# Patient Record
Sex: FEMALE | Race: WHITE | Employment: FULL TIME | URBAN - METROPOLITAN AREA
[De-identification: names, ages, dates, MRNs, and addresses within clinical notes are randomized per-mention and may not be internally consistent; named-entity substitution may affect disease eponyms.]

---

## 2018-01-05 ENCOUNTER — APPOINTMENT (OUTPATIENT)
Dept: PHYSICAL THERAPY | Age: 53
End: 2018-01-05

## 2018-01-19 ENCOUNTER — HOSPITAL ENCOUNTER (OUTPATIENT)
Dept: PHYSICAL THERAPY | Age: 53
Discharge: HOME | End: 2018-01-19
Payer: COMMERCIAL

## 2018-01-19 PROCEDURE — 97140 MANUAL THERAPY 1/> REGIONS: CPT

## 2018-01-19 PROCEDURE — 97535 SELF CARE MNGMENT TRAINING: CPT

## 2018-01-19 PROCEDURE — 97161 PT EVAL LOW COMPLEX 20 MIN: CPT

## 2018-01-19 NOTE — PROGRESS NOTES
July De La [address]za 32  [facility] [place] [facility] AT [facility] [address] [facility], [address] ,[address], [address]  Phone: [phone]  Fax: [phone] / [address] [address] PHYSICAL THERAPY SERVICES  Patient Name: Holly Bermudez : 1965   Medical   Diagnosis: Cervicalgia [M54.2]  Left shoulder pain [M25.512] Treatment Diagnosis: Cervical pain2   Onset Date: chronic     Referral Source: Jennifer Rodriguez MD Start of Care [facility]): 2018   Prior Hospitalization: See medical history Provider #: [id number]   Prior Level of Function: Manageable sx with ADLs   Comorbidities: None   Medications: Verified on Patient Summary List   The Plan of Care and following information is based on the information from the initial evaluation.   ==================================================================================  Assessment / key information:  Patient is a 47 y.o. female who presents to [facility] with Dx of c/s pain. 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 Patient reports sx are constant in nature with worsening of sx with prolonged sitting & at night with additional c/o intermittent paraesthesias & dysthesias in B hands with c/o functional weakness (she is RHD), with gripping, lifting & generalized use of B UE. Sx improve with rest & c/s stretching, heat or ice. Average reported pain level at 5/10, 7/10 at worst & 5/10 at best. She previous h/o epidural injection with some relief although temporary in nature. She reports having regular massages with CMT on a monthly basis. Upon objective evaluation patient demonstrates grossly 75% c/s AROM in all planes, limited by generalized ERP throughout all planes. No change in sx with repeated movements in both flex/ext in loaded postures today. Slight reduction of sx, NBAR with manual c/s txn, no change with RRIL.   Spurling test was (-) bilaterally. Patient can benefit from PT interventions to improve posture, decrease pain & improve strength, to facilitate ADLs & overall functional status.   ==================================================================================  Eval Complexity: History LOW Complexity : Zero comorbidities / personal factors that will impact the outcome / POC;  Examination  MEDIUM Complexity : 3 Standardized tests and measures addressing body structure, function, activity limitation and / or participation in recreation ; Presentation LOW Complexity : Stable, uncomplicated ;  Decision Making MEDIUM Complexity : FOTO score of 26-74; Overall Complexity LOW   Problem List: pain affecting function, decrease ROM, decrease strength, decrease ADL/ functional abilitiies, decrease activity tolerance, decrease flexibility/ joint mobility and other FOTO 54   Treatment Plan may include any combination of the following: Therapeutic exercise, Therapeutic activities, Neuromuscular re-education, Physical agent/modality, Manual therapy, Patient education, Self Care training and Other: DN prn  Patient / Family readiness to learn indicated by: asking questions, trying to perform skills and interest  Persons(s) to be included in education: patient (P)  Barriers to Learning/Limitations: None  Measures taken:    Patient Goal (s): \"reduce pain, strengthen muscles in neck, shoulders, perfect posture\"   Patient self reported health status: good  Rehabilitation Potential: good   Short Term Goals: To be accomplished in  2  weeks:  1) Establish HEP to prevent further disability. 2) Patient will report decreased c/o pain to < or = 3-4/10 to facilitate prolonged sitting with manageable sx in c/s.  3) Patient will be able to demonstrate the appropriate sitting posture with or without use of l/s roll to facilitate sitting activities at home/work.  Long Term Goals:  To be accomplished in  4  weeks:  1) Patient will report decreased c/o pain to < or = 2-3/10 to facilitate prolonged sitting with manageable sx in c/s.  2) Improve c/s AROM to 75% in all planes so ROM is available for driving activities without exacerbation of c/s sx. 3) Improve FOTO score from 54 points to > or = 61 points indicating improved tolerance with ADLs in regards to c/s.  4) Patient to be independent & compliant with HEP in preparation for D/C. Frequency / Duration:   Patient to be seen  2-3  times per week for 4  weeks:  Patient / Caregiver education and instruction: self care, activity modification and brace/ splint application  G-Codes (GP): NA  Therapist Signature: SABRINA Cabello cert MDT Date: 1/72/1058   Certification Period: NA Time: 11:19 AM   ===========================================================================================  I certify that the above Physical Therapy Services are being furnished while the patient is under my care. I agree with the treatment plan and certify that this therapy is necessary. Physician Signature:        Date:       Time:     Please sign and return to [facility] [place] or you may fax the signed copy to [phone]. Thank you.

## 2018-01-19 NOTE — PROGRESS NOTES
PHYSICAL THERAPY - DAILY TREATMENT NOTE    Patient Name: Susi Garcia        Date: 2018  : 1965   YES Patient  Verified  Visit #:     Insurance: Payor: Elaine Baumgarten / Plan: Marielena Jerry PPO / Product Type: PPO /      In time: 11:15 A Out time: 12:05 P   Total Treatment Time: 50     Medicare Time Tracking (below)   Total Timed Codes (min):  NA 1:1 Treatment Time:  NA     TREATMENT AREA =  Cervicalgia [M54.2]  Left shoulder pain [M25.512]    SUBJECTIVE  Pain Level (on 0 to 10 scale):  5-6  / 10   Medication Changes/New allergies or changes in medical history, any new surgeries or procedures?     NO    If yes, update Summary List   Subjective Functional Status/Changes:  []  No changes reported     See POC          OBJECTIVE  Modalities Rationale:     decrease pain to improve patient's ability to return to pain-free ADLs   min [] Estim, type/location:                                      []  att     []  unatt     []  w/US     []  w/ice    []  w/heat    min []  Mechanical Traction: type/lbs                   []  pro   []  sup   []  int   []  cont    []  before manual    []  after manual    min []  Ultrasound, settings/location:      min []  Iontophoresis w/ dexamethasone, location:                                               []  take home patch       []  in clinic   10 min []  Ice     [x]  Heat    location/position: Supine to c/s    min []  Vasopneumatic Device, press/temp:    10 min [x]  Other: Pt ed on posture, use of sleeping roll & demonstrated in both supine & S/L positions with 1 pillow, sitting posture   [] Skin assessment post-treatment (if applicable):    []  intact    []  redness- no adverse reaction     []redness  adverse reaction:         min Therapeutic Exercise:  [x]  See flow sheet   Rationale:          10 min Manual Therapy: STM/MFR to R>L upper/lower c/s paraspinals in supine, manual c/s txn in supine   Rationale:      decrease pain, increase ROM, increase tissue extensibility, decrease trigger points and increase postural awareness to improve patient's ability to return to pain-free c/s AROM with ADLs     min Therapeutic Activity:    Rationale:      min Neuromuscular Re-ed:    Rationale:        min Gait Training:    Rationale:       min Patient Education:  YES  Reviewed HEP   []  Progressed/Changed HEP based on: Other Objective/Functional Measures:    See POC     Post Treatment Pain Level (on 0 to 10) scale:   5-6  / 10     ASSESSMENT  Assessment/Changes in Function:   See POC     []  See Progress Note/Recertification   Patient will continue to benefit from skilled PT services to analyze and cue movement patterns, analyze and modify body mechanics/ergonomics, assess and modify postural abnormalities and instruct in home and community integration to attain remaining goals.    Progress toward goals / Updated goals:    Progressing towards goals established at [address] [facility] [address]  [x]  Upgrade activities as tolerated YES Continue plan of care   []  Discharge due to :    []  Other:      Therapist: Jeronimo Aden PT    Date: 1/19/2018 Time: 4:38 PM     Future Appointments  Date Time Provider China Charles   1/23/2018 12:00 PM Uzma Romero PT [facility]   1/26/2018 11:00 AM Jeronimo dAen PT [facility] [place]   1/30/2018 9:30 AM Uzma Romero PT [facility] [place]   2/1/2018 4:00 PM Jeronimo Aden PT [facility] [place]   2/6/2018 12:00 PM Jeronimo Aden PT [facility] [place]   2/8/2018 10:30 AM Uzma Romero PT [facility] [place]   2/13/2018 11:00 AM Uzma Romero PT [facility] [place]   2/15/2018 10:30 AM Shaniqua Patel, PT [facility]

## 2018-01-23 ENCOUNTER — APPOINTMENT (OUTPATIENT)
Dept: PHYSICAL THERAPY | Age: 53
End: 2018-01-23
Payer: COMMERCIAL

## 2018-01-26 ENCOUNTER — HOSPITAL ENCOUNTER (OUTPATIENT)
Dept: PHYSICAL THERAPY | Age: 53
Discharge: HOME | End: 2018-01-26
Payer: COMMERCIAL

## 2018-01-26 PROCEDURE — 97110 THERAPEUTIC EXERCISES: CPT

## 2018-01-26 PROCEDURE — 97140 MANUAL THERAPY 1/> REGIONS: CPT

## 2018-01-26 NOTE — PROGRESS NOTES
PHYSICAL THERAPY - DAILY TREATMENT NOTE    Patient Name: Radu Ao        Date: 2018  : 1965   YES Patient  Verified  Visit #:      12  Insurance: Payor: Ashley Myers / Plan: Luz Marina Begun PPO / Product Type: PPO /      In time: 11 A Out time: 12:03 P   Total Treatment Time: 60     Medicare Time Tracking (below)   Total Timed Codes (min):  NA 1:1 Treatment Time:  NA     TREATMENT AREA =  Cervical spine pain [M54.2]    SUBJECTIVE  Pain Level (on 0 to 10 scale):  5  / 10   Medication Changes/New allergies or changes in medical history, any new surgeries or procedures? NO    If yes, update Summary List   Subjective Functional Status/Changes:  []  No changes reported     Patient reports she just came from her chiropractor adjustment this am & will see her CMT today, she tries to see them at least once/month.            OBJECTIVE  Modalities Rationale:     decrease pain to improve patient's ability to return to pain-free sitting    min [] Estim, type/location:                                      []  att     []  unatt     []  w/US     []  w/ice    []  w/heat    min []  Mechanical Traction: type/lbs                   []  pro   []  sup   []  int   []  cont    []  before manual    []  after manual    min []  Ultrasound, settings/location:      min []  Iontophoresis w/ dexamethasone, location:                                               []  take home patch       []  in clinic   10 min []  Ice     [x]  Heat    location/position: Supine to c/s    min []  Vasopneumatic Device, press/temp:     min []  Other:    [] Skin assessment post-treatment (if applicable):    []  intact    []  redness- no adverse reaction     []redness  adverse reaction:        35 min Therapeutic Exercise:  [x]  See flow sheet   Rationale:      increase ROM and increase strength to improve the patients ability to return to light lifting at home      15 min Manual Therapy: Manual c/s txn, RRIL with OP, STM/DTM to B upper/lower c/s paraspinals in supine   Rationale:     Decrease pain with c/s AROM with driving     min Therapeutic Activity:    Rationale:         min Neuromuscular Re-ed:    Rationale:       min Gait Training:    Rationale:       min Patient Education:  YES  Reviewed HEP   []  Progressed/Changed HEP based on: Other Objective/Functional Measures:    Initiated TE per flow sheet     Post Treatment Pain Level (on 0 to 10) scale:   3-4  / 10     ASSESSMENT  Assessment/Changes in Function:     Decreased c/o pain with gentle c/s stretches today & 1/2 FR stretch      []  See Progress Note/Recertification   Patient will continue to benefit from skilled PT services to modify and progress therapeutic interventions, address functional mobility deficits, address ROM deficits, analyze and address soft tissue restrictions, assess and modify postural abnormalities and instruct in home and community integration to attain remaining goals.    Progress toward goals / Updated goals:    Progressing towards goals established at [address]  [x]  Upgrade activities as tolerated YES Continue plan of care   []  Discharge due to :    []  Other:      Therapist: Maria C Gomez, PT    Date: 1/26/2018 Time: 12:59 PM     Future Appointments  Date Time Provider China Charles   1/30/2018 9:30 AM Kelby Tineo, PT [facility] [place]   2/1/2018 4:00 PM Maria C Gomez, PT [facility] [place]   2/6/2018 12:00 PM Maria C Gomez PT [facility] [place]   2/8/2018 10:30 AM Kelby Tineo PT [facility] [place]   2/13/2018 11:00 AM Kelby Tineo PT [facility] [place]   2/15/2018 10:30 AM Glendia Charm Orest Mortimer, PT [facility] [place]

## 2018-01-30 ENCOUNTER — HOSPITAL ENCOUNTER (OUTPATIENT)
Dept: PHYSICAL THERAPY | Age: 53
Discharge: HOME | End: 2018-01-30
Payer: COMMERCIAL

## 2018-01-30 PROCEDURE — 97140 MANUAL THERAPY 1/> REGIONS: CPT

## 2018-01-30 PROCEDURE — 97110 THERAPEUTIC EXERCISES: CPT

## 2018-01-30 NOTE — PROGRESS NOTES
PHYSICAL THERAPY - DAILY TREATMENT NOTE    Patient Name: Stephen Milian        Date: 2018  : 1965   YES Patient  Verified  Visit #:   3   of   12  Insurance: Payor: Sanaz Greenberg / Plan: Angela Burleson PPO / Product Type: PPO /      In time: 940 Out time: 1025   Total Treatment Time: 40     Medicare Time Tracking (below)   Total Timed Codes (min):   1:1 Treatment Time:       TREATMENT AREA =  Cervical spine pain [M54.2]  SUBJECTIVE  Pain Level (on 0 to 10 scale):  3  / 10   Medication Changes/New allergies or changes in medical history, any new surgeries or procedures?     NO    If yes, update Summary List   Subjective Functional Status/Changes:  []  No changes reported     The pain is worse when Im having to study/read for school and look down          OBJECTIVE  Modalities Rationale:     decrease inflammation, decrease pain and increase tissue extensibility to improve patient's ability to perform functional ADLs   min [] Estim, type/location:                                      []  att     []  unatt     []  w/US     []  w/ice    []  w/heat    min []  Mechanical Traction: type/lbs                   []  pro   []  sup   []  int   []  cont    []  before manual    []  after manual    min []  Ultrasound, settings/location:      min []  Iontophoresis w/ dexamethasone, location:                                               []  take home patch       []  in clinic   @ home min []  Ice     []  Heat    location/position:     min []  Vasopneumatic Device, press/temp:     min []  Other:    [] Skin assessment post-treatment (if applicable):    []  intact    []  redness- no adverse reaction     []redness  adverse reaction:        30 min Therapeutic Exercise:  [x]  See flow sheet   Rationale:      increase ROM and increase strength to improve the patients ability to regain functional mobility of C/S for improved positional tolerance    10 min Manual Therapy: Technique:      [] S/DTM []IASTM []PROM [] Passive Stretching [x]manual TPR  [x] TDN (see objective; actual needle insertion time not billed)  []Jt manipulation:Gr I [] II []  III [] IV[] V[]  Treatment/Area:  c/s   Rationale:      decrease pain, increase ROM, increase tissue extensibility and decrease trigger points to improve patient's ability to tolerate reading    Dry Needling Procedure Note    Dry Needle Session Number:  1    Procedure: An intramuscular manual therapy (dry needling) and a neuro-muscular re-education treatment was done to deactivate myofascial trigger points, with a 15/30 gauge solid filament needle, under aseptic technique. Indication(s): [] Muscle spasms [x] Myalgia/Myositis  [] Muscle cramps      [x] Muscle imbalances [] TMD (TMJ) [x] Myofascial pain & dysfunction     [] Other: __    Chart reviewed for the following:  IMeghana, PT, have reviewed the medical history, summary list and precautions/contraindications for [facility]. TIME OUT performed immediately prior to start of procedure:  100 (enter time the timeout was completed)  IJeffrey, PT, have performed the following reviews on [facility] prior to the start of the session:      [x] Patient was identified by name and date of birth    [x] Agreement on all muscles being treated was verified   [x] Purpose of dry needling, side effects, possible complications, risks and benefits were explained to the patient   [x] Procedure site(s) verified  [x] Patient was positioned for comfort and draped for privacy  [x] Informed Consent was signed (initial visit) and verified verbally (subsequent visits)  [x] Patient was instructed on the need to report the use of blood thinners and/or immunosuppressant medications  [x] How to respond to possible adverse effects of treatment  [x] Self treatment of post needling soreness: ice, heat (moist heat, heat wraps) and stretching  [x] Opportunity was given to ask any questions, all questions were answered            Treatment:   The following muscles were treated today:    Right: UT, mid c/s paraspinals   Left: UT and mid c/s paraspinals     Patients response to todays treatment:   [x]  LTRs  []  Muscle Relaxation  []  Pain Relief  []  Decreased Tinnitus  []  Decreased HAs [x]  Post needling soreness  [x]  Increased ROM   []  Other:         min Therapeutic Activity:    Rationale:    increase strength, improve coordination and increase proprioception to improve the patients ability to      min Neuromuscular Re-ed:    Rationale:    improve coordination, improve balance and increase proprioception to improve the patients ability to      min Gait Training:    Rationale:       min Patient Education:  YES  Reviewed HEP   []  Progressed/Changed HEP based on: Other Objective/Functional Measures:    + LTR elicited to muscles to treated muscles with dry needling technique. No adverse reactions from [address]  Postural review including elevating papers/books on desk to dec need for c/s flexion     Post Treatment Pain Level (on 0 to 10) scale:   3  / 10     ASSESSMENT  Assessment/Changes in Function:     Progressed treatment as appropriate with good patient tolerance     []  See Progress Note/Recertification   Patient will continue to benefit from skilled PT services to modify and progress therapeutic interventions, address functional mobility deficits, address ROM deficits, address strength deficits, analyze and address soft tissue restrictions, analyze and cue movement patterns, analyze and modify body mechanics/ergonomics and assess and modify postural abnormalities to attain remaining goals.    Progress toward goals / Updated goals:    Progressing towards STG2     PLAN  [x]  Upgrade activities as tolerated YES Continue plan of care   []  Discharge due to :    []  Other:      Therapist: Samreen Calvo, PT, DPT, MTC, CMTPT    Date: 1/30/2018 Time: 12:56 PM     Future Appointments  Date Time Provider China Charles   2/1/2018 4:00 PM Haresh Bowman Adela Banks [facility]   2/6/2018 12:00 PM Michelle Saucedo [facility]   2/8/2018 10:30 AM Marino London, PT [facility]   2/13/2018 11:00 AM Marino London, PT [facility]   2/15/2018 10:30 AM Shaniqua Mar, PT [facility]

## 2018-02-01 ENCOUNTER — HOSPITAL ENCOUNTER (OUTPATIENT)
Dept: PHYSICAL THERAPY | Age: 53
Discharge: HOME | End: 2018-02-01
Payer: COMMERCIAL

## 2018-02-01 PROCEDURE — 97140 MANUAL THERAPY 1/> REGIONS: CPT

## 2018-02-01 PROCEDURE — 97110 THERAPEUTIC EXERCISES: CPT

## 2018-02-01 NOTE — PROGRESS NOTES
PHYSICAL THERAPY - DAILY TREATMENT NOTE    Patient Name: Bryan Aguilar        Date: 2018  : 1965   YES Patient  Verified  Visit #:     Insurance: Payor: Ronen Massey / Plan: Cruz Hoffman PPO / Product Type: PPO /      In time: 4:10 P Out time: 5:10 P   Total Treatment Time: 54     Medicare Time Tracking (below)   Total Timed Codes (min):  NA 1:1 Treatment Time:  NA     TREATMENT AREA =  Cervical spine pain [M54.2]    SUBJECTIVE  Pain Level (on 0 to 10 scale):  6  / 10   Medication Changes/New allergies or changes in medical history, any new surgeries or procedures? NO    If yes, update Summary List   Subjective Functional Status/Changes:  []  No changes reported     Patient reports she did well after DN last visit. She was scrubbing her carpet earlier & could feel pain in her neck.   She will see her CMT tonight at 7:00 pm.        OBJECTIVE  Modalities Rationale:     decrease pain to improve patient's ability to return to pain-free c/s AROM   min [] Estim, type/location:                                      []  att     []  unatt     []  w/US     []  w/ice    []  w/heat    min []  Mechanical Traction: type/lbs                   []  pro   []  sup   []  int   []  cont    []  before manual    []  after manual    min []  Ultrasound, settings/location:      min []  Iontophoresis w/ dexamethasone, location:                                               []  take home patch       []  in clinic   10 min []  Ice     [x]  Heat    location/position: Supine to c/s     min []  Vasopneumatic Device, press/temp:     min []  Other:    [] Skin assessment post-treatment (if applicable):    []  intact    []  redness- no adverse reaction     []redness  adverse reaction:        30 min Therapeutic Exercise:  [x]  See flow sheet   Rationale:      increase ROM and increase strength to improve the patients ability to return to pain-free ADLs     15 min Manual Therapy: DTM/TPR to B UT & lower c/s paraspinals, manual c/s txn in supine    Rationale:     Improve ROM      min Therapeutic Activity:    Rationale:         min Neuromuscular Re-ed:    Rationale:       min Gait Training:    Rationale:       min Patient Education:  YES  Reviewed HEP   []  Progressed/Changed HEP based on: Other Objective/Functional Measures:    Progressed to full FR  RRIS, RREIS today     Post Treatment Pain Level (on 0 to 10) scale:   4  / 10     ASSESSMENT  Assessment/Changes in Function:     Improved tolerance to RREIS today with no increase in c/s pain     []  See Progress Note/Recertification   Patient will continue to benefit from skilled PT services to modify and progress therapeutic interventions, address functional mobility deficits, address ROM deficits, address strength deficits and instruct in home and community integration to attain remaining goals.    Progress toward goals / Updated goals:    Progressing towards LTG 2     PLAN  [x]  Upgrade activities as tolerated YES Continue plan of care   []  Discharge due to :    []  Other:      Therapist: Javier Maloney, PT    Date: 2/1/2018 Time: 6:34 PM     Future Appointments  Date Time Provider China Charles   2/6/2018 12:00 PM Fransisco Baum [facility]   2/8/2018 10:30 AM Sergio Champagne, PT [facility]   2/13/2018 11:00 AM Sergio Champagne PT [facility]   2/15/2018 10:30 AM Javier Maloney PT [facility]

## 2018-02-06 ENCOUNTER — HOSPITAL ENCOUNTER (OUTPATIENT)
Dept: PHYSICAL THERAPY | Age: 53
Discharge: HOME | End: 2018-02-06
Payer: COMMERCIAL

## 2018-02-06 PROCEDURE — 97012 MECHANICAL TRACTION THERAPY: CPT

## 2018-02-06 PROCEDURE — 97140 MANUAL THERAPY 1/> REGIONS: CPT

## 2018-02-06 PROCEDURE — 97110 THERAPEUTIC EXERCISES: CPT

## 2018-02-06 NOTE — PROGRESS NOTES
PHYSICAL THERAPY - DAILY TREATMENT NOTE    Patient Name: Danny Mata        Date: 2018  : 1965   YES Patient  Verified  Visit #:     Insurance: Payor: Sweetie Grade / Plan: Julio Cesar Swartz PPO / Product Type: PPO /      In time: 12:15 P Out time: 1:15 P   Total Treatment Time: 54     Medicare Time Tracking (below)   Total Timed Codes (min):  NA 1:1 Treatment Time:  NA     TREATMENT AREA =  Cervical spine pain [M54.2]    SUBJECTIVE  Pain Level (on 0 to 10 scale):  5  / 10   Medication Changes/New allergies or changes in medical history, any new surgeries or procedures? NO    If yes, update Summary List   Subjective Functional Status/Changes:  []  No changes reported     Patient reports no new complaints since last treatment, she has been feeling better since starting PT. She is trying to be aware of her posture & is doing her chin tucks throughout her day.           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    min [] Estim, type/location:                                      []  att     []  unatt     []  w/US     []  w/ice    []  w/heat   10 min [x]  Mechanical Traction: type/lbs 19#                  []  pro   [x]  sup   []  int   [x]  cont    []  before manual    [x]  after manual    min []  Ultrasound, settings/location:      min []  Iontophoresis w/ dexamethasone, location:                                               []  take home patch       []  in clinic   10  NB min []  Ice     [x]  Heat    location/position: With c/s txn in supine    min []  Vasopneumatic Device, press/temp:     min []  Other:    [] Skin assessment post-treatment (if applicable):    []  intact    []  redness- no adverse reaction     []redness  adverse reaction:        25 min Therapeutic Exercise:  [x]  See flow sheet   Rationale:      increase ROM and increase strength to improve the patients ability to return to light lifting     20 min Manual Therapy: Manual c/s txn, RRIL with PT OP, DTM/TPR to B UT, lower c/s in supine   Rationale:      decrease pain, increase ROM, increase tissue extensibility, decrease trigger points and increase postural awareness to improve patient's ability to return to pain-free c/s AROM with driving     min Therapeutic Activity:    Rationale:      min Neuromuscular Re-ed:    Rationale:        min Gait Training:    Rationale:       min Patient Education:  YES  Reviewed HEP   []  Progressed/Changed HEP based on: Other Objective/Functional Measures:    TE per flow sheet      Post Treatment Pain Level (on 0 to 10) scale:   3  / 10     ASSESSMENT  Assessment/Changes in Function:   Good reduction in L sided c/s pain after trial of c/s txn today, mechanical      []  See Progress Note/Recertification   Patient will continue to benefit from skilled PT services to modify and progress therapeutic interventions, address functional mobility deficits, address ROM deficits, analyze and address soft tissue restrictions, analyze and cue movement patterns, analyze and modify body mechanics/ergonomics and assess and modify postural abnormalities to attain remaining goals.    Progress toward goals / Updated goals:    Progressing towards STG 2, 3     PLAN  [x]  Upgrade activities as tolerated YES Continue plan of care   []  Discharge due to :    []  Other:      Therapist: Soco Espinal PT    Date: 2/6/2018 Time: 1:46 PM     Future Appointments  Date Time Provider [place] Charles   2/8/2018 10:30 AM Sherita Asif PT [facility] [place]   2/13/2018 11:00 AM Sherita Asif PT [facility] [place]   2/15/2018 10:30 AM Soco Espinal PT [facility] [place]

## 2018-02-08 ENCOUNTER — APPOINTMENT (OUTPATIENT)
Dept: PHYSICAL THERAPY | Age: 53
End: 2018-02-08
Payer: COMMERCIAL

## 2018-02-09 ENCOUNTER — HOSPITAL ENCOUNTER (OUTPATIENT)
Dept: PHYSICAL THERAPY | Age: 53
Discharge: HOME | End: 2018-02-09
Payer: COMMERCIAL

## 2018-02-09 PROCEDURE — 97140 MANUAL THERAPY 1/> REGIONS: CPT

## 2018-02-09 PROCEDURE — 97110 THERAPEUTIC EXERCISES: CPT

## 2018-02-09 NOTE — PROGRESS NOTES
PHYSICAL THERAPY - DAILY TREATMENT NOTE    Patient Name: Garrison Saint        Date: 2018  : 1965   YES Patient  Verified  Visit #:     Insurance: Payor: Savanah Ego / Plan: Mat Daily PPO / Product Type: PPO /      In time: 1200 Out time: 110   Total Treatment Time: 55     Medicare Time Tracking (below)   Total Timed Codes (min):   1:1 Treatment Time:       TREATMENT AREA =  Cervical spine pain [M54.2]  SUBJECTIVE  Pain Level (on 0 to 10 scale):  5  / 10   Medication Changes/New allergies or changes in medical history, any new surgeries or procedures?     NO    If yes, update Summary List   Subjective Functional Status/Changes:  []  No changes reported     I felt a lot of relief the day after DN last time         OBJECTIVE  Modalities Rationale:     decrease inflammation, decrease pain and increase tissue extensibility to improve patient's ability to perform functional ADLs   min [] Estim, type/location:                                      []  att     []  unatt     []  w/US     []  w/ice    []  w/heat    min []  Mechanical Traction: type/lbs                   []  pro   []  sup   []  int   []  cont    []  before manual    []  after manual    min []  Ultrasound, settings/location:      min []  Iontophoresis w/ dexamethasone, location:                                               []  take home patch       []  in clinic   10 min [x]  Ice     []  Heat    location/position: C/s in supine    min []  Vasopneumatic Device, press/temp:     min []  Other:    [] Skin assessment post-treatment (if applicable):    []  intact    []  redness- no adverse reaction     []redness  adverse reaction:        30 min Therapeutic Exercise:  [x]  See flow sheet   Rationale:      increase ROM and increase strength to improve the patients ability to regain functional mobility of C/S for improved positional tolerance    15 min Manual Therapy: Technique:      [x] S/DTM []IASTM []PROM [] Passive Stretching   [x]manual TPR [x] TDN (see objective; actual needle insertion time not billed)  []Jt manipulation:Gr I [] II []  III [] IV[] V[]  Treatment/Area:  c/s   Rationale:      decrease pain, increase ROM, increase tissue extensibility and decrease trigger points to improve patient's ability to tolerate reading    Dry Needling Procedure Note    Dry Needle Session Number:  2  Procedure: An intramuscular manual therapy (dry needling) and a neuro-muscular re-education treatment was done to deactivate myofascial trigger points, with a 15/30 gauge solid filament needle, under aseptic technique. Indication(s): [] Muscle spasms [x] Myalgia/Myositis  [] Muscle cramps      [x] Muscle imbalances [] TMD (TMJ) [x] Myofascial pain & dysfunction     [] Other: __    Chart reviewed for the following:  IMeghana, PT, have reviewed the medical history, summary list and precautions/contraindications for [facility].      TIME OUT performed immediately prior to start of procedure:  1220pm (enter time the timeout was completed)  ITerrie, PT, have performed the following reviews on [facility] prior to the start of the session:      [x] Patient was identified by name and date of birth    [x] Agreement on all muscles being treated was verified   [x] Purpose of dry needling, side effects, possible complications, risks and benefits were explained to the patient   [x] Procedure site(s) verified  [x] Patient was positioned for comfort and draped for privacy  [x] Informed Consent was signed (initial visit) and verified verbally (subsequent visits)  [x] Patient was instructed on the need to report the use of blood thinners and/or immunosuppressant medications  [x] How to respond to possible adverse effects of treatment  [x] Self treatment of post needling soreness: ice, heat (moist heat, heat wraps) and stretching  [x] Opportunity was given to ask any questions, all questions were answered            Treatment:  The following muscles were treated today:    Right: UT, mid c/s paraspinals   Left: UT, mid c/s paraspinals     Patients response to todays treatment:   [x]  LTRs  []  Muscle Relaxation  []  Pain Relief  []  Decreased Tinnitus  []  Decreased HAs [x]  Post needling soreness  [x]  Increased ROM   []  Other:         min Therapeutic Activity:    Rationale:    increase strength, improve coordination and increase proprioception to improve the patients ability to      min Neuromuscular Re-ed:    Rationale:    improve coordination, improve balance and increase proprioception to improve the patients ability to      min Gait Training:    Rationale:       min Patient Education:  YES  Reviewed HEP   []  Progressed/Changed HEP based on: Other Objective/Functional Measures:    + LTR elicited to muscles to treated muscles with dry needling technique. No adverse reactions from [place]  TE per FS     Post Treatment Pain Level (on 0 to 10) scale:   4 / 10     ASSESSMENT  Assessment/Changes in Function:     Progressed treatment as appropriate with good patient tolerance     []  See Progress Note/Recertification   Patient will continue to benefit from skilled PT services to modify and progress therapeutic interventions, address functional mobility deficits, address ROM deficits, address strength deficits, analyze and address soft tissue restrictions, analyze and cue movement patterns, analyze and modify body mechanics/ergonomics and assess and modify postural abnormalities to attain remaining goals.    Progress toward goals / Updated goals:    Progressing towards STG2     PLAN  [x]  Upgrade activities as tolerated YES Continue plan of care   []  Discharge due to :    []  Other:      Therapist: Leanna English, PT, DPT, MTC, CMTPT    Date: 2/9/2018 Time: 12:56 PM     Future Appointments  Date Time Provider [place] Charles   2/13/2018 11:00 AM Manoj Batista, PT [facility] [place]   2/15/2018 10:30 AM Katheryn Heath, PT [facility] [place]

## 2018-02-13 ENCOUNTER — HOSPITAL ENCOUNTER (OUTPATIENT)
Dept: PHYSICAL THERAPY | Age: 53
Discharge: HOME | End: 2018-02-13
Payer: COMMERCIAL

## 2018-02-13 PROCEDURE — 97140 MANUAL THERAPY 1/> REGIONS: CPT

## 2018-02-13 PROCEDURE — 97012 MECHANICAL TRACTION THERAPY: CPT

## 2018-02-13 PROCEDURE — 97110 THERAPEUTIC EXERCISES: CPT

## 2018-02-15 ENCOUNTER — APPOINTMENT (OUTPATIENT)
Dept: PHYSICAL THERAPY | Age: 53
End: 2018-02-15
Payer: COMMERCIAL

## 2018-02-20 ENCOUNTER — APPOINTMENT (OUTPATIENT)
Dept: PHYSICAL THERAPY | Age: 53
End: 2018-02-20
Payer: COMMERCIAL

## 2018-02-23 ENCOUNTER — HOSPITAL ENCOUNTER (OUTPATIENT)
Dept: PHYSICAL THERAPY | Age: 53
Discharge: HOME | End: 2018-02-23
Payer: COMMERCIAL

## 2018-02-23 PROCEDURE — 97012 MECHANICAL TRACTION THERAPY: CPT

## 2018-02-23 PROCEDURE — 97140 MANUAL THERAPY 1/> REGIONS: CPT

## 2018-02-23 PROCEDURE — 97110 THERAPEUTIC EXERCISES: CPT

## 2018-02-23 NOTE — PROGRESS NOTES
PHYSICAL THERAPY - DAILY TREATMENT NOTE    Patient Name: Danny Mata        Date: 2018  : 1965   YES Patient  Verified  Visit #:     Insurance: Payor: Sweetie Grade / Plan: Julio Cesar Swartz PPO / Product Type: PPO /      In time: 1230 Out time: 135   Total Treatment Time: 40     Medicare Time Tracking (below)   Total Timed Codes (min):   1:1 Treatment Time:       TREATMENT AREA =  Cervical spine pain [M54.2]  SUBJECTIVE  Pain Level (on 0 to 10 scale):  3-4  / 10   Medication Changes/New allergies or changes in medical history, any new surgeries or procedures?     NO    If yes, update Summary List   Subjective Functional Status/Changes:  []  No changes reported     Elevating my books so im looking at them more forward and not down has helped a lot       OBJECTIVE  Modalities Rationale:     decrease inflammation, decrease pain and increase tissue extensibility to improve patient's ability to perform functional ADLs   min [] Estim, type/location:                                      []  att     []  unatt     []  w/US     []  w/ice    []  w/heat   10 min [x]  Mechanical Traction: type/lbs 18# with CP                  []  pro   [x]  sup   []  int   [x]  cont    []  before manual    [x]  after manual    min []  Ultrasound, settings/location:      min []  Iontophoresis w/ dexamethasone, location:                                               []  take home patch       []  in clinic    min []  Ice     []  Heat    location/position:     min []  Vasopneumatic Device, press/temp:     min []  Other:    [] Skin assessment post-treatment (if applicable):    []  intact    []  redness- no adverse reaction     []redness  adverse reaction:        20 min Therapeutic Exercise:  [x]  See flow sheet   Rationale:      increase ROM and increase strength to improve the patients ability to regain functional mobility of C/S for improved positional tolerance    10 min Manual Therapy: Technique:      [x] S/DTM []IASTM []PROM [] Passive Stretching   [x]manual TPR  [x] TDN (see objective; actual needle insertion time not billed)  []Jt manipulation:Gr I [] II []  III [] IV[] V[]  Treatment/Area:  c/s   Rationale:      decrease pain, increase ROM, increase tissue extensibility and decrease trigger points to improve patient's ability to tolerate reading    Dry Needling Procedure Note    Dry Needle Session Number:  4  Procedure: An intramuscular manual therapy (dry needling) and a neuro-muscular re-education treatment was done to deactivate myofascial trigger points, with a 15/30 gauge solid filament needle, under aseptic technique. Indication(s): [] Muscle spasms [x] Myalgia/Myositis  [] Muscle cramps      [x] Muscle imbalances [] TMD (TMJ) [x] Myofascial pain & dysfunction     [] Other: __    Chart reviewed for the following:  IMeghana, PT, have reviewed the medical history, summary list and precautions/contraindications for [facility].      TIME OUT performed immediately prior to start of procedure:  1240pm (enter time the timeout was completed)  ITerrie, PT, have performed the following reviews on [facility] prior to the start of the session:      [x] Patient was identified by name and date of birth    [x] Agreement on all muscles being treated was verified   [x] Purpose of dry needling, side effects, possible complications, risks and benefits were explained to the patient   [x] Procedure site(s) verified  [x] Patient was positioned for comfort and draped for privacy  [x] Informed Consent was signed (initial visit) and verified verbally (subsequent visits)  [x] Patient was instructed on the need to report the use of blood thinners and/or immunosuppressant medications  [x] How to respond to possible adverse effects of treatment  [x] Self treatment of post needling soreness: ice, heat (moist heat, heat wraps) and stretching  [x] Opportunity was given to ask any questions, all questions were answered Treatment:  The following muscles were treated today:    Right: UT   Left: UT     Patients response to todays treatment:   [x]  LTRs  []  Muscle Relaxation  []  Pain Relief  []  Decreased Tinnitus  []  Decreased HAs [x]  Post needling soreness  [x]  Increased ROM   []  Other:         min Therapeutic Activity:    Rationale:    increase strength, improve coordination and increase proprioception to improve the patients ability to      min Neuromuscular Re-ed:    Rationale:    improve coordination, improve balance and increase proprioception to improve the patients ability to      min Gait Training:    Rationale:       min Patient Education:  YES  Reviewed HEP   []  Progressed/Changed HEP based on: Other Objective/Functional Measures:    + LTR elicited to muscles to treated muscles with dry needling technique. No adverse reactions from [address]    TE per FS     Post Treatment Pain Level (on 0 to 10) scale:   3-4  / 10     ASSESSMENT  Assessment/Changes in Function:     Able to perform c/s retract without cueing for proper execution     []  See Progress Note/Recertification   Patient will continue to benefit from skilled PT services to modify and progress therapeutic interventions, address functional mobility deficits, address ROM deficits, address strength deficits, analyze and address soft tissue restrictions, analyze and cue movement patterns, analyze and modify body mechanics/ergonomics and assess and modify postural abnormalities to attain remaining goals.    Progress toward goals / Updated goals:    Progressing towards c/s stabilization strength/endurance     PLAN  [x]  Upgrade activities as tolerated YES Continue plan of care   []  Discharge due to :    []  Other:      Therapist: Josue Colorado PT, DPT, MTC, CMTPT    Date: 2/23/2018 Time: 12:56 PM     Future Appointments  Date Time Provider China Charles   2/26/2018 11:00 AM Marino London PT [facility] [place]   3/2/2018 12:00 PM Triny Agudelo, PT [facility]

## 2018-02-26 ENCOUNTER — APPOINTMENT (OUTPATIENT)
Dept: PHYSICAL THERAPY | Age: 53
End: 2018-02-26
Payer: COMMERCIAL

## 2018-03-02 ENCOUNTER — HOSPITAL ENCOUNTER (OUTPATIENT)
Dept: PHYSICAL THERAPY | Age: 53
Discharge: HOME | End: 2018-03-02
Payer: COMMERCIAL

## 2018-03-02 PROCEDURE — 97140 MANUAL THERAPY 1/> REGIONS: CPT

## 2018-03-02 PROCEDURE — 97110 THERAPEUTIC EXERCISES: CPT

## 2018-03-02 PROCEDURE — 97012 MECHANICAL TRACTION THERAPY: CPT

## 2018-03-02 NOTE — PROGRESS NOTES
[address]  [facility] [place] [facility]  [facility], [address] [place] [address], [address]  Phone: [phone]  Fax: [phone] SUMMARY  Patient Name: Aki Beyer : 1965   Treatment/Medical Diagnosis: Cervical spine pain [M54.2]   Referral Source: Celina Roger MD     Date of Initial Visit: 18 Attended Visits: 9 Missed Visits: 0     SUMMARY OF TREATMENT  Therapeutic exercise including ROM, stretching, gentle strengthening, stabilization training, postural ed, patient education, HEP instruction, MHP, manual therapy, dry needling, manual/mechanical c/s traction. CURRENT STATUS  Mrs. Aguila has made good progress with PT & continues to report fairly constant c/o pain in c/s with average reported pain level at 3-5/10. She reports 50% improvement in overall function & will be placed on hold from PT at this time & was in agreement with managing her sx with her current home program.     Goal/Measure of Progress Goal Met? 1.  Establish HEP to prevent further disability. Status at last Eval: NA Current Status: Good compliance with HEP yes   2. Patient will report decreased c/o pain to < or = 3-4/10 to facilitate prolonged sitting with manageable sx in c/s. Status at last Eval: 7/10 at worst  5/10 average  5/10 at best Current Status: 6-7/10 at worst  3-5/10 average  3/10 at best Partially met   3. Patient will be able to demonstrate the appropriate sitting posture with or without use of l/s roll to facilitate sitting activities at home/work. Status at last Eval: unable Current Status: Good sitting posture in both un/supported sitting posture yes   4. Improve FOTO score from 54 points to > or = 61 points indicating improved tolerance with ADLs in regards to c/s.    Status at last Eval: 54 Current Status: 55 Partially met      RECOMMENDATIONS  Other: Partially met goals, DC to home program    If you have any questions/comments please contact us directly at [phone]. Thank you for allowing us to assist in the care of your patient.     Therapist Signature: SABRINA Talley, cert MDT Date: 4-94-16     Time: 4:02 PM

## 2018-03-02 NOTE — PROGRESS NOTES
PHYSICAL THERAPY - DAILY TREATMENT NOTE    Patient Name: Radha Davidson        Date: 3/2/2018  : 1965   YES Patient  Verified  Visit #:     Insurance: Payor: Tino Fuentes / Plan: Nicho Melendez PPO / Product Type: PPO /      In time: 12:10 P Out time: 1:15 P   Total Treatment Time: 50     Medicare Time Tracking (below)   Total Timed Codes (min):  NA 1:1 Treatment Time:  NA     TREATMENT AREA =  Cervical spine pain [M54.2]    SUBJECTIVE  Pain Level (on 0 to 10 scale):  3  / 10   Medication Changes/New allergies or changes in medical history, any new surgeries or procedures?     NO    If yes, update Summary List   Subjective Functional Status/Changes:  []  No changes reported     See DC summary           OBJECTIVE  Modalities Rationale:     decrease pain to improve patient's ability to return to pain-free ADLs   min [] Estim, type/location:                                      []  att     []  unatt     []  w/US     []  w/ice    []  w/heat   15 min [x]  Mechanical Traction: type/lbs 19#                  []  pro   [x]  sup   []  int   [x]  cont    []  before manual    [x]  after manual    min []  Ultrasound, settings/location:      min []  Iontophoresis w/ dexamethasone, location:                                               []  take home patch       []  in clinic   15  NB min []  Ice     [x]  Heat    location/position: C/s with txn in supine    min []  Vasopneumatic Device, press/temp:     min []  Other:    [] Skin assessment post-treatment (if applicable):    []  intact    []  redness- no adverse reaction     []redness  adverse reaction:        15 min Therapeutic Exercise:  [x]  See flow sheet   Rationale:      increase ROM and increase strength to improve the patients ability to return to light lifting     20 min Manual Therapy: DTM/TPR to B c/s PS, manual c/s txn in supine   Rationale:      decrease pain, increase ROM, increase tissue extensibility, decrease trigger points and increase postural awareness to improve patient's ability to return to pain-free sitting     min Therapeutic Activity:    Rationale:      min Neuromuscular Re-ed:    Rationale:        min Gait Training:    Rationale:       min Patient Education:  YES  Reviewed HEP   []  Progressed/Changed HEP based on: Other Objective/Functional Measures:    FOTO 55     Post Treatment Pain Level (on 0 to 10) scale:   3  / 10     ASSESSMENT  Assessment/Changes in Function:   Pt in agreement with being on hold from PT, possible DC to HEP, updated HEP & issued GTB     []  See Progress Note/Recertification   Patient will continue to benefit from skilled PT services to instruct in home and community integration to attain remaining goals. Progress toward goals / Updated goals:    Progressing towards LTGs, see summary to MD     PLAN  []  Upgrade activities as tolerated  Continue plan of care   [x]  Discharge due to : Goals partially met   []  Other:      Therapist: Ivie Castleman, PT    Date: 3/2/2018 Time: 3:58 PM     No future appointments.

## 2019-09-04 ENCOUNTER — HOSPITAL ENCOUNTER (OUTPATIENT)
Dept: PHYSICAL THERAPY | Age: 54
Discharge: HOME | End: 2019-09-04
Payer: COMMERCIAL

## 2019-09-04 PROCEDURE — 97162 PT EVAL MOD COMPLEX 30 MIN: CPT

## 2019-09-04 PROCEDURE — 97110 THERAPEUTIC EXERCISES: CPT

## 2019-09-04 NOTE — PROGRESS NOTES
July [address]  [facility] CLINIC [place] PHYSICAL THERAPY AT [place] [facility] [address] [facility], [address] [address] ,[address], [address]  Phone: [phone]  Fax: [phone] / [address] [address] PHYSICAL THERAPY SERVICES  Patient Name: Brianne Farley : 1965   Medical   Diagnosis: Knee pain, bilateral [M25.561, M25.562] Treatment Diagnosis: B knee OA   Onset Date: 2019     Referral Source: Saniya Molina NP Start of Care [facility]): 2019   Prior Hospitalization: See medical history Provider #: [id number]   Prior Level of Function: Speed walking 2mi 3x/week, yoga   Comorbidities: arthritis   Medications: Verified on Patient Summary List   The Plan of Care and following information is based on the information from the initial evaluation.   ===========================================================================================  Assessment / key information: Patient is a 49 y.o. female who presents to [facility] at [facility] with diagnosis of B knee OA. The patient reports onset of B knee pain in 2019. She used to walk 2 mi 3x/week and had taken break for 2 months while in school. During a week in April, she walked everyday and reports onset of B knee pain (L>R). Knee pain worsened with prolonged standing, walking, squatting and stair negotiation. Pain progressively worsened when exercising. She received cortisone inj in both knees 2-3 weeks ago and attempted walking 30 min yesterday, reporting 3-4/10 pain the following day. Xrays reveal mod/severe B knee OA.     Objective PT examination findings include:  Squat Assessment: B: inc WS to L, R knee varus; single leg squat: valgus collapse of knee, contralateral hip drop, inc hip IR (L>R)  AROM: B knee WNL  MMT: B hip abd 3+/5, quad 4-/5 MMT    Palpation: inc L lateral patellar tracking, L>R crepitus during patellar compression test    A home exercise program was demonstrated and provided to address the above objective and functional deficits. Patient can benefit from skilled PT interventions to improve strength, decrease pain, to facilitate performance of ADLs & improve overall functional status.   ===========================================================================================  Eval Complexity: History MEDIUM  Complexity : 1-2 comorbidities / personal factors will impact the outcome/ POC ;  Examination  MEDIUM Complexity : 3 Standardized tests and measures addressing body structure, function, activity limitation and / or participation in recreation ; Presentation MEDIUM Complexity : Evolving with changing characteristics ; Decision Making MEDIUM Complexity : FOTO score of 26-74; Overall Complexity MEDIUM  Problem List: pain affecting function, decrease ROM, decrease strength, decrease ADL/ functional abilitiies, decrease activity tolerance and decrease flexibility/ joint mobility, FOTO score TBA  Treatment Plan may include any combination of the following: Therapeutic exercise, Therapeutic activities, Neuromuscular re-education, Physical agent/modality, Gait/balance training, Manual therapy including dry needling, Aquatic therapy and Patient education  Patient / Family readiness to learn indicated by: asking questions, trying to perform skills and interest  Persons(s) to be included in education: patient (P)  Barriers to Learning/Limitations: no  Measures taken:    Patient Goal (s): \"less pain\"   Patient self reported health status: good  Rehabilitation Potential: good   Short Term Goals: To be accomplished in  1-2  weeks:  1) Patient to be independent and compliant with initial HEP to prevent further disability. 2) Improve B hip abd strength to 4-/5 MMT in order to perform squat with normal mechanics. 3) Patient will report decreased c/o pain to < or = 1/10 to facilitate ease with walking 30 min with manageable sx in B knees.  Long Term Goals:  To be accomplished in  3-4  weeks:  1) Patient to be independent & compliant with a progressive, high level HEP in order to maintain gains made in physical therapy. 2) Improve FOTO score to TBA indicating significant functional improvement in order to return to PLOF and recreational walking. 3) Patient will demonstrate good eccentric quad control in lateral step down off 6'' step with no compensation to facilitate normalized gait pattern for stair negotiation. Frequency / Duration:   Patient to be seen  2-3  times per week for 3-4  weeks:  Patient / Caregiver education and instruction: self care, activity modification and exercises    Therapist Signature: Josi Epstein PT, DPT, MTC, CMTPT Date: 2/7/7783   Certification Period: NA Time: 7:19 PM   ===========================================================================================  I certify that the above Physical Therapy Services are being furnished while the patient is under my care. I agree with the treatment plan and certify that this therapy is necessary. Physician Signature:        Date:       Time:     Please sign and return to [facility] or you may fax the signed copy to [phone]. Thank you.

## 2019-09-04 NOTE — PROGRESS NOTES
PHYSICAL THERAPY - DAILY TREATMENT NOTE    Patient Name: Gisela Hansen        Date: 2019  : 1965   YES Patient  Verified  Visit #:     Insurance: Payor: Johnny Lin / Plan: Rudolph Juares PPO / Product Type: PPO /      In time: 415 Out time: 500   Total Treatment Time: 45     BCBS/Medicare Time Tracking (below)   Total Timed Codes (min):   1:1 Treatment Time:       TREATMENT AREA =  Knee pain, bilateral [M25.561, M25.562]    SUBJECTIVE  Pain Level (on 0 to 10 scale):  3  / 10   Medication Changes/New allergies or changes in medical history, any new surgeries or procedures? NO    If yes, update Summary List   Subjective Functional Status/Changes:  []  No changes reported   The patient reports onset of B knee pain in 2019. She used to walk 2 mi 3x/week and had taken break for 2 months while in school. During a week in April, she walked everyday and reports onset of B knee pain (L>R). Knee pain worsened with prolonged standing, walking, squatting and stair negotiation. Pain progressively worsened when exercising. She received cortisone inj in both knees 2-3 weeks ago and attempted walking 30 min yesterday, reporting 3-4/10 pain the following day. Xrays reveal mod/severe B knee OA.         Modalities Rationale:     decrease inflammation, decrease pain and increase tissue extensibility to improve patient's ability to perform ADLs   min [] Estim, type/location:                                      []  att     []  unatt     []  w/US     []  w/ice    []  w/heat    min []  Mechanical Traction: type/lbs                   []  pro   []  sup   []  int   []  cont    []  before manual    []  after manual    min []  Ultrasound, settings/location:      min []  Iontophoresis w/ dexamethasone, location:                                               []  take home patch       []  in clinic    min []  Ice     []  Heat    location/position:     min []  Vasopneumatic Device, press/temp:     min []  Other:    [x] Skin assessment post-treatment (if applicable):    [x]  intact    [x]  redness- no adverse reaction     []redness  adverse reaction:        10 min Therapeutic Exercise:  [x]  See flow sheet   Rationale:      increase ROM and increase strength to improve the patients ability to walk pain free       Billed With/As:   [] TE   [] TA   [] Neuro   [] Self Care Patient Education: [x] Review HEP    [] Progressed/Changed HEP based on:   [] positioning   [] body mechanics   [] transfers   [] heat/ice application    [x] other: Issued written HEP and reviewed     Other Objective/Functional Measures:    Squat Assessment: B: inc WS to L, R knee varus; single leg squat: valgus collapse of knee, contralateral hip drop, inc hip IR (L>R)  AROM: B knee WNL  MMT: B hip abd 3+/5, quad 4-/5 MMT    Palpation: inc L lateral patellar tracking, L>R crepitus during patellar compression test     Post Treatment Pain Level (on 0 to 10) scale:   3  / 10     ASSESSMENT  Assessment/Changes in Function:     See POC     []  See Progress Note/Recertification   Patient will continue to benefit from skilled PT services to modify and progress therapeutic interventions, address functional mobility deficits, address ROM deficits, address strength deficits, analyze and address soft tissue restrictions, analyze and cue movement patterns, analyze and modify body mechanics/ergonomics, assess and modify postural abnormalities, address imbalance/dizziness and instruct in home and community integration to attain remaining goals. Progress toward goals / Updated goals:    Progressing towards newly established goals in [address] [facility] [address]  [x]  Upgrade activities as tolerated YES Continue plan of care   []  Discharge due to :    []  Other:      Therapist: Pascal Barthel, PT, DPT, MTC, CMTPT    Date: 9/4/2019 Time: 4:17 PM     No future appointments.

## 2019-09-11 ENCOUNTER — HOSPITAL ENCOUNTER (OUTPATIENT)
Dept: PHYSICAL THERAPY | Age: 54
Discharge: HOME | End: 2019-09-11
Payer: COMMERCIAL

## 2019-09-11 PROCEDURE — 97110 THERAPEUTIC EXERCISES: CPT

## 2019-09-11 NOTE — PROGRESS NOTES
PHYSICAL THERAPY - DAILY TREATMENT NOTE    Patient Name: Fermin Urbano        Date: 2019  : 1965   YES Patient  Verified  Visit #:     Insurance: Payor: Tony Wooten / Plan: Maria C Pacheco PPO / Product Type: PPO /      In time: 305 Out time: 410   Total Treatment Time: 60     BCBS/Medicare Time Tracking (below)   Total Timed Codes (min):   1:1 Treatment Time:       TREATMENT AREA =  Knee pain, bilateral [M25.561, M25.562]    SUBJECTIVE  Pain Level (on 0 to 10 scale):  4  / 10   Medication Changes/New allergies or changes in medical history, any new surgeries or procedures? NO    If yes, update Summary List   Subjective Functional Status/Changes:  []  No changes reported     The soreness is a little better.  im walking 30 min        Modalities Rationale:     decrease inflammation, decrease pain and increase tissue extensibility to improve patient's ability to perform ADLs   min [] Estim, type/location:                                      []  att     []  unatt     []  w/US     []  w/ice    []  w/heat    min []  Mechanical Traction: type/lbs                   []  pro   []  sup   []  int   []  cont    []  before manual    []  after manual    min []  Ultrasound, settings/location:      min []  Iontophoresis w/ dexamethasone, location:                                               []  take home patch       []  in clinic   PD min []  Ice     []  Heat    location/position:     min []  Vasopneumatic Device, press/temp:     min []  Other:    [x] Skin assessment post-treatment (if applicable):    [x]  intact    [x]  redness- no adverse reaction     []redness  adverse reaction:        60 min Therapeutic Exercise:  [x]  See flow sheet   Rationale:      increase ROM and increase strength to improve the patients ability to walk pain free       Billed With/As:   [] TE   [] TA   [] Neuro   [] Self Care Patient Education: [x] Review HEP    [] Progressed/Changed HEP based on:   [] positioning   [] body mechanics   [] transfers   [] heat/ice application    [x] other: Issued written HEP and reviewed     Other Objective/Functional Measures:    TE per FS     Post Treatment Pain Level (on 0 to 10) scale:   3  / 10     ASSESSMENT  Assessment/Changes in Function:     Cueing required for inc glut med recruitment during lat step down and minisquat to maintain neutral hip/knee in sagittal plane     []  See Progress Note/Recertification   Patient will continue to benefit from skilled PT services to modify and progress therapeutic interventions, address functional mobility deficits, address ROM deficits, address strength deficits, analyze and address soft tissue restrictions, analyze and cue movement patterns, analyze and modify body mechanics/ergonomics, assess and modify postural abnormalities, address imbalance/dizziness and instruct in home and community integration to attain remaining goals.    Progress toward goals / Updated goals:    Progressing towards STG1     PLAN  [x]  Upgrade activities as tolerated YES Continue plan of care   []  Discharge due to :    []  Other:      Therapist: Trine Apley, PT, DPT, MTC, CMTPT    Date: 9/11/2019 Time: 4:17 PM     Future Appointments   Date Time Provider [place]   9/16/2019  3:00 PM Migdalia Siddiqui, PT [facility]   9/23/2019  3:00 PM Migdalia Siddiqui PT [facility]   9/30/2019  5:30 PM Migdalia Siddiqui PT [facility]

## 2019-09-16 ENCOUNTER — APPOINTMENT (OUTPATIENT)
Dept: PHYSICAL THERAPY | Age: 54
End: 2019-09-16
Payer: COMMERCIAL

## 2019-09-18 ENCOUNTER — APPOINTMENT (OUTPATIENT)
Dept: PHYSICAL THERAPY | Age: 54
End: 2019-09-18
Payer: COMMERCIAL

## 2019-09-20 ENCOUNTER — APPOINTMENT (OUTPATIENT)
Dept: PHYSICAL THERAPY | Age: 54
End: 2019-09-20
Payer: COMMERCIAL

## 2019-09-23 ENCOUNTER — APPOINTMENT (OUTPATIENT)
Dept: PHYSICAL THERAPY | Age: 54
End: 2019-09-23
Payer: COMMERCIAL

## 2019-09-30 ENCOUNTER — APPOINTMENT (OUTPATIENT)
Dept: PHYSICAL THERAPY | Age: 54
End: 2019-09-30
Payer: COMMERCIAL

## 2019-10-02 ENCOUNTER — APPOINTMENT (OUTPATIENT)
Dept: PHYSICAL THERAPY | Age: 54
End: 2019-10-02
Payer: COMMERCIAL

## 2019-10-07 ENCOUNTER — HOSPITAL ENCOUNTER (OUTPATIENT)
Dept: PHYSICAL THERAPY | Age: 54
Discharge: HOME | End: 2019-10-07
Payer: COMMERCIAL

## 2019-10-07 PROCEDURE — 97110 THERAPEUTIC EXERCISES: CPT

## 2019-10-07 NOTE — PROGRESS NOTES
PHYSICAL THERAPY - DAILY TREATMENT NOTE    Patient Name: Radha Young        Date: 10/7/2019  : 1965   YES Patient  Verified  Visit #:   3   of   12  Insurance: Payor: Avis Landaverde / Plan: Ghazala Molina PPO / Product Type: PPO /      In time: 430 Out time: 600   Total Treatment Time: 75     BCBS/Medicare Time Tracking (below)   Total Timed Codes (min):   1:1 Treatment Time:       TREATMENT AREA =  Knee pain, bilateral [M25.561, M25.562]    SUBJECTIVE  Pain Level (on 0 to 10 scale):  4-5  / 10   Medication Changes/New allergies or changes in medical history, any new surgeries or procedures?     NO    If yes, update Summary List   Subjective Functional Status/Changes:  []  No changes reported      I can feel the shot wearing off on my L knee        Modalities Rationale:     decrease inflammation, decrease pain and increase tissue extensibility to improve patient's ability to perform ADLs   min [] Estim, type/location:                                      []  att     []  unatt     []  w/US     []  w/ice    []  w/heat    min []  Mechanical Traction: type/lbs                   []  pro   []  sup   []  int   []  cont    []  before manual    []  after manual    min []  Ultrasound, settings/location:      min []  Iontophoresis w/ dexamethasone, location:                                               []  take home patch       []  in clinic   10 min [x]  Ice     []  Heat    location/position: B knees in supine    min []  Vasopneumatic Device, press/temp:     min []  Other:    [x] Skin assessment post-treatment (if applicable):    [x]  intact    [x]  redness- no adverse reaction     []redness  adverse reaction:        65 min Therapeutic Exercise:  [x]  See flow sheet   Rationale:      increase ROM and increase strength to improve the patients ability to walk pain free     Billed With/As:   [x] TE   [] TA   [] Neuro   [] Self Care Patient Education: [x] Review HEP    [] Progressed/Changed HEP based on:   [] positioning [] body mechanics   [] transfers   [] heat/ice application    [] other:      Other Objective/Functional Measures:    TE per FS,   Added inv HS/airplane     Post Treatment Pain Level (on 0 to 10) scale:   3-4  / 10     ASSESSMENT  Assessment/Changes in Function:     Cueing required for full L knee ext and inc quad recruitment during SLR and TKE. C/o L ant knee pain during 4\" lat step down but no pain during 2\" when cued to limit post WS. Difficulty maintaining balance and neutral pelvic/hip alignment during airplane     []  See Progress Note/Recertification   Patient will continue to benefit from skilled PT services to modify and progress therapeutic interventions, address functional mobility deficits, address ROM deficits, address strength deficits, analyze and address soft tissue restrictions, analyze and cue movement patterns, analyze and modify body mechanics/ergonomics, assess and modify postural abnormalities, address imbalance/dizziness and instruct in home and community integration to attain remaining goals.    Progress toward goals / Updated goals:    Progressing towards STG1, requires cueing for form     PLAN  [x]  Upgrade activities as tolerated YES Continue plan of care   []  Discharge due to :    []  Other:      Therapist: Mazin Reyes PT, DPT, MTC, CMTPT    Date: 10/7/2019 Time: 4:17 PM     Future Appointments   Date Time Provider [place] Charles   10/14/2019  4:30 PM Nuria Bro PT [facility] [place]   10/21/2019  4:30 PM Nuria Bro PT [facility] [place]

## 2019-10-14 ENCOUNTER — HOSPITAL ENCOUNTER (OUTPATIENT)
Dept: PHYSICAL THERAPY | Age: 54
Discharge: HOME | End: 2019-10-14
Payer: COMMERCIAL

## 2019-10-14 PROCEDURE — 97110 THERAPEUTIC EXERCISES: CPT

## 2019-10-14 NOTE — PROGRESS NOTES
PHYSICAL THERAPY - DAILY TREATMENT NOTE    Patient Name: Erica Pulido        Date: 10/14/2019  : 1965   YES Patient  Verified  Visit #:     Insurance: Payor: Chiquis Crawley / Plan: Ace Cheyenne PPO / Product Type: PPO /      In time: 430 Out time: 535   Total Treatment Time: 60     BCBS/Medicare Time Tracking (below)   Total Timed Codes (min):   1:1 Treatment Time:       TREATMENT AREA =  Knee pain, bilateral [M25.561, M25.562]    SUBJECTIVE  Pain Level (on 0 to 10 scale):  5  / 10   Medication Changes/New allergies or changes in medical history, any new surgeries or procedures? NO    If yes, update Summary List   Subjective Functional Status/Changes:  []  No changes reported     I can tell the shot is wearing off and L knee seems to be getting worse.  I can tell how weak I still am        Modalities Rationale:     decrease inflammation, decrease pain and increase tissue extensibility to improve patient's ability to perform ADLs   min [] Estim, type/location:                                      []  att     []  unatt     []  w/US     []  w/ice    []  w/heat    min []  Mechanical Traction: type/lbs                   []  pro   []  sup   []  int   []  cont    []  before manual    []  after manual    min []  Ultrasound, settings/location:      min []  Iontophoresis w/ dexamethasone, location:                                               []  take home patch       []  in clinic   PD min []  Ice     []  Heat    location/position:     min []  Vasopneumatic Device, press/temp:     min []  Other:    [x] Skin assessment post-treatment (if applicable):    [x]  intact    [x]  redness- no adverse reaction     []redness  adverse reaction:        60 min Therapeutic Exercise:  [x]  See flow sheet   Rationale:      increase ROM and increase strength to improve the patients ability to walk pain free     Billed With/As:   [x] TE   [] TA   [] Neuro   [] Self Care Patient Education: [x] Review HEP    [] Progressed/Changed HEP based on:   [] positioning   [] body mechanics   [] transfers   [] heat/ice application    [x] other: SLR with ER     Other Objective/Functional Measures:    TE per FS,   Added SAQ and SLR with ER     Post Treatment Pain Level (on 0 to 10) scale:   4  / 10     ASSESSMENT  Assessment/Changes in Function:     L quad quick to fatigue, requiring cueing to maintain full knee ext during SLR. []  See Progress Note/Recertification   Patient will continue to benefit from skilled PT services to modify and progress therapeutic interventions, address functional mobility deficits, address ROM deficits, address strength deficits, analyze and address soft tissue restrictions, analyze and cue movement patterns, analyze and modify body mechanics/ergonomics, assess and modify postural abnormalities, address imbalance/dizziness and instruct in home and community integration to attain remaining goals.    Progress toward goals / Updated goals:    Met STG1     PLAN  [x]  Upgrade activities as tolerated YES Continue plan of care   []  Discharge due to :    []  Other:      Therapist: Meera Cap, PT, DPT, MTC, CMTPT    Date: 10/14/2019 Time: 4:17 PM     Future Appointments   Date Time Provider [place] Charles   10/21/2019  4:30 PM Abelardo Love, PT [facility] [place]

## 2019-10-21 ENCOUNTER — HOSPITAL ENCOUNTER (OUTPATIENT)
Dept: PHYSICAL THERAPY | Age: 54
Discharge: HOME | End: 2019-10-21
Payer: COMMERCIAL

## 2019-10-21 PROCEDURE — 97110 THERAPEUTIC EXERCISES: CPT

## 2019-10-21 NOTE — PROGRESS NOTES
PHYSICAL THERAPY - DAILY TREATMENT NOTE    Patient Name: Jasper Merlos        Date: 10/21/2019  : 1965   YES Patient  Verified  Visit #:     Insurance: Payor: Amita Figueroa / Plan: Alexandra Marin PPO / Product Type: PPO /      In time: 430 Out time: 535   Total Treatment Time: 60     BCBS/Medicare Time Tracking (below)   Total Timed Codes (min):   1:1 Treatment Time:       TREATMENT AREA =  Knee pain, bilateral [M25.561, M25.562]    SUBJECTIVE  Pain Level (on 0 to 10 scale):  5-6  / 10   Medication Changes/New allergies or changes in medical history, any new surgeries or procedures?     NO    If yes, update Summary List   Subjective Functional Status/Changes:  []  No changes reported     I flared my L knee up walking a lot in grass/hills for a football game        Modalities Rationale:     decrease inflammation, decrease pain and increase tissue extensibility to improve patient's ability to perform ADLs   min [] Estim, type/location:                                      []  att     []  unatt     []  w/US     []  w/ice    []  w/heat    min []  Mechanical Traction: type/lbs                   []  pro   []  sup   []  int   []  cont    []  before manual    []  after manual    min []  Ultrasound, settings/location:      min []  Iontophoresis w/ dexamethasone, location:                                               []  take home patch       []  in clinic   PD min []  Ice     []  Heat    location/position:     min []  Vasopneumatic Device, press/temp:     min []  Other:    [x] Skin assessment post-treatment (if applicable):    [x]  intact    [x]  redness- no adverse reaction     []redness  adverse reaction:        60 min Therapeutic Exercise:  [x]  See flow sheet   Rationale:      increase ROM and increase strength to improve the patients ability to walk pain free     Billed With/As:   [x] TE   [] TA   [] Neuro   [] Self Care Patient Education: [x] Review HEP    [] Progressed/Changed HEP based on:   [] positioning   [] body mechanics   [] transfers   [] heat/ice application    [x] other: SLS with slight knee flex     Other Objective/Functional Measures:    TE per FS,   Added SLS with knee ext and flex     Post Treatment Pain Level (on 0 to 10) scale:   4 / 10     ASSESSMENT  Assessment/Changes in Function:     Improving quad and glut med strength. Able to tolerte L 2\" lat step down without c/o ant knee pain when cued for inc post hip WS/hip hinge     []  See Progress Note/Recertification   Patient will continue to benefit from skilled PT services to modify and progress therapeutic interventions, address functional mobility deficits, address ROM deficits, address strength deficits, analyze and address soft tissue restrictions, analyze and cue movement patterns, analyze and modify body mechanics/ergonomics, assess and modify postural abnormalities, address imbalance/dizziness and instruct in home and community integration to attain remaining goals.    Progress toward goals / Updated goals:    Slow progress towards LTG3     PLAN  [x]  Upgrade activities as tolerated YES Continue plan of care   []  Discharge due to :    []  Other:      Therapist: Ayala Carrasco PT, DPT, MTC, CMTPT    Date: 10/21/2019 Time: 4:17 PM     Future Appointments   Date Time Provider [place] Charles   10/30/2019  3:00 PM Yefri Marquez PT [facility]   11/6/2019  5:30 PM Yefri Marquez PT [facility] [place] [facility]

## 2019-10-30 ENCOUNTER — HOSPITAL ENCOUNTER (OUTPATIENT)
Dept: PHYSICAL THERAPY | Age: 54
Discharge: HOME | End: 2019-10-30
Payer: COMMERCIAL

## 2019-10-30 PROCEDURE — 97110 THERAPEUTIC EXERCISES: CPT

## 2019-10-30 NOTE — PROGRESS NOTES
PHYSICAL THERAPY - DAILY TREATMENT NOTE    Patient Name: Litzy Medley        Date: 10/30/2019  : 1965   YES Patient  Verified  Visit #:     Insurance: Payor: France Perez / Plan: Joaquim Bustamante PPO / Product Type: PPO /      In time: 300 Out time: 345   Total Treatment Time: 45     BCBS/Medicare Time Tracking (below)   Total Timed Codes (min):   1:1 Treatment Time:       TREATMENT AREA =  Knee pain, bilateral [M25.561, M25.562]    SUBJECTIVE  Pain Level (on 0 to 10 scale): 0  / 10   Medication Changes/New allergies or changes in medical history, any new surgeries or procedures?     NO    If yes, update Summary List   Subjective Functional Status/Changes:  []  No changes reported     I got the leftover PRP form my shoulder in my L knee and started the anti- inflammatories and both knees feel better        Modalities Rationale:     decrease inflammation, decrease pain and increase tissue extensibility to improve patient's ability to perform ADLs   min [] Estim, type/location:                                      []  att     []  unatt     []  w/US     []  w/ice    []  w/heat    min []  Mechanical Traction: type/lbs                   []  pro   []  sup   []  int   []  cont    []  before manual    []  after manual    min []  Ultrasound, settings/location:      min []  Iontophoresis w/ dexamethasone, location:                                               []  take home patch       []  in clinic   PD min []  Ice     []  Heat    location/position:     min []  Vasopneumatic Device, press/temp:     min []  Other:    [x] Skin assessment post-treatment (if applicable):    [x]  intact    [x]  redness- no adverse reaction     []redness  adverse reaction:        45 min Therapeutic Exercise:  [x]  See flow sheet   Rationale:      increase ROM and increase strength to improve the patients ability to walk pain free     Billed With/As:   [x] TE   [] TA   [] Neuro   [] Self Care Patient Education: [x] Review HEP    [] Progressed/Changed HEP based on:   [] positioning   [] body mechanics   [] transfers   [] heat/ice application    [] other:     Other Objective/Functional Measures:    TE per FS,   Progressed to 3\" lat step down (4\" painful) and added TG unilateral squat     Post Treatment Pain Level (on 0 to 10) scale:   0  / 10     ASSESSMENT  Assessment/Changes in Function:     Progressed treatment as appropriate with good patient tolerance     []  See Progress Note/Recertification   Patient will continue to benefit from skilled PT services to modify and progress therapeutic interventions, address functional mobility deficits, address ROM deficits, address strength deficits, analyze and address soft tissue restrictions, analyze and cue movement patterns, analyze and modify body mechanics/ergonomics, assess and modify postural abnormalities, address imbalance/dizziness and instruct in home and community integration to attain remaining goals.    Progress toward goals / Updated goals:    Met STG2     PLAN  [x]  Upgrade activities as tolerated YES Continue plan of care   []  Discharge due to :    []  Other:      Therapist: Jewels Oro PT, DPT, MTC, CMTPT    Date: 10/30/2019 Time: 4:17 PM     Future Appointments   Date Time Provider China Charles   11/6/2019  5:30 PM Ada Dixon, PT [facility]

## 2019-11-13 ENCOUNTER — APPOINTMENT (OUTPATIENT)
Dept: PHYSICAL THERAPY | Age: 54
End: 2019-11-13
Payer: COMMERCIAL

## 2019-11-18 ENCOUNTER — HOSPITAL ENCOUNTER (OUTPATIENT)
Dept: PHYSICAL THERAPY | Age: 54
Discharge: HOME | End: 2019-11-18
Payer: COMMERCIAL

## 2019-11-18 PROCEDURE — 97110 THERAPEUTIC EXERCISES: CPT

## 2019-11-18 NOTE — PROGRESS NOTES
PHYSICAL THERAPY - DAILY TREATMENT NOTE    Patient Name: Chacorta Kinney        Date: 2019  : 1965   YES Patient  Verified  Visit #:   7   of   7  Insurance: Payor: Lino Pitt / Plan: Reema Walker PPO / Product Type: PPO /      In time: 530 Out time: 600   Total Treatment Time: 30     BCBS/Medicare Time Tracking (below)   Total Timed Codes (min):   1:1 Treatment Time:       TREATMENT AREA =  Knee pain, bilateral [M25.561, M25.562]    SUBJECTIVE  Pain Level (on 0 to 10 scale): 0  / 10   Medication Changes/New allergies or changes in medical history, any new surgeries or procedures? NO    If yes, update Summary List   Subjective Functional Status/Changes:  []  No changes reported     My knee has been feeling good.  The bowflex doesn't bother it and adán felt stronger going up the stairs at work        Modalities Rationale:     decrease inflammation, decrease pain and increase tissue extensibility to improve patient's ability to perform ADLs   min [] Estim, type/location:                                      []  att     []  unatt     []  w/US     []  w/ice    []  w/heat    min []  Mechanical Traction: type/lbs                   []  pro   []  sup   []  int   []  cont    []  before manual    []  after manual    min []  Ultrasound, settings/location:      min []  Iontophoresis w/ dexamethasone, location:                                               []  take home patch       []  in clinic   PD min []  Ice     []  Heat    location/position:     min []  Vasopneumatic Device, press/temp:     min []  Other:    [x] Skin assessment post-treatment (if applicable):    [x]  intact    [x]  redness- no adverse reaction     []redness  adverse reaction:        30 min Therapeutic Exercise:  [x]  See flow sheet   Rationale:      increase ROM and increase strength to improve the patients ability to walk pain free     Billed With/As:   [x] TE   [] TA   [] Neuro   [] Self Care Patient Education: [x] Review HEP    [] Progressed/Changed HEP based on:   [] positioning   [] body mechanics   [] transfers   [] heat/ice application    [x] other: Issued written HEP and reviewed DC instructions     Other Objective/Functional Measures:    TE per FS     Post Treatment Pain Level (on 0 to 10) scale:   0  / 10     ASSESSMENT  Assessment/Changes in Function:     Progressed treatment as appropriate with good patient tolerance     []  See Progress Note/Recertification   Patient will continue to benefit from skilled PT services to modify and progress therapeutic interventions, address functional mobility deficits, address ROM deficits, address strength deficits, analyze and address soft tissue restrictions, analyze and cue movement patterns, analyze and modify body mechanics/ergonomics, assess and modify postural abnormalities, address imbalance/dizziness and instruct in home and community integration to attain remaining goals.    Progress toward goals / Updated goals:    Met LTG1     PLAN  [x]  Upgrade activities as tolerated YES Continue plan of care   []  Discharge due to :    []  Other:      Therapist: Melinda Blunt, PT, DPT, MTC, CMTPT    Date: 11/18/2019 Time: 4:17 PM     Future Appointments   Date Time Provider China Charles   11/25/2019  4:30 PM Alma Minor, PT [facility]

## 2019-11-19 NOTE — PROGRESS NOTES
[address]   PHYSICAL THERAPY  [address] [facility] [address], [place], [address] -   Phone: [phone]  Fax: [phone]  []  PROGRESS NOTE  [x]  DISCHARGE SUMMARY  Patient Name: Litzy Medley : 1965   Treatment Diagnosis: Knee pain, bilateral [J95.158, M25.562]     Referral Source: Alan Mar NP     Date of Initial Visit: 19 Attended Visits: 7 Missed Visits: 6     SUMMARY OF TREATMENT  Therapeutic exercises including ROM, strengthening and stretching; gait and balance training, postural re-education, modalities: CP, HEP instruction. CURRENT STATUS  Patient is a 49 y.o. female who presents to [facility] at [facility] with diagnosis of B knee OA. She notes minimal to no knee pain since receiving injections and refraining from daily speed walking. She has begun using Bowflex elliptical with good tolerance and is continuing to progress well through NWB LE strengthening exercises. She has been issued a progressive HEP to continue LE strengthening will minimizing risk of further injury. Goal/Measure of Progress Goal Met? 1. Patient to be independent & compliant with a progressive, high level HEP in order to maintain gains made in physical therapy. Status at last Eval: Established Current Status: I with HEP yes   2. Improve B hip abd strength to 4-/5 MMT in order to perform squat with normal mechanics. Status at last Eval: 4-/5 Current Status: 4- to 4/5 yes   3. Patient will report decreased c/o pain to < or = 1/10 to facilitate ease with walking 30 min with manageable sx in B knees. Status at last Eval: 5/10 Current Status: 0-1/10 on Bowflex elliptical Partially met     RECOMMENDATIONS  Discontinue PT due to:  Specifics: Program complete. All goals met. Thank you for this referral.    If you have any questions/comments please contact us directly at [phone].    Thank you for allowing us to assist in the care of your patient.     Therapist Signature: Berenice Allan PT, DPT, MTC, CMTPT Date: 11/19/2019   Reporting Period: NA Time: 1:00 PM

## 2019-11-19 NOTE — PROGRESS NOTES
[address] PHYSICAL THERAPY  [address] [facility] [address], [place], [address] -   Phone: [phone]  Fax: [phone]  [x]  PROGRESS NOTE  []   [facility] SUMMARY  Patient Name: Savanna Victor : 1965   Treatment Diagnosis: Knee pain, bilateral [B14.890, M25.562]     Referral Source: Evan Hendrix NP     Date of Initial Visit: 19 Attended Visits: 4 Missed Visits: 3     SUMMARY OF TREATMENT  Therapeutic exercises including ROM, strengthening and stretching; gait and balance training, postural re-education, modalities: CP, HEP instruction. CURRENT STATUS  Patient is a 49 y.o. female who presents to [facility] at [facility] with diagnosis of B knee OA. She is progressing well through NWB LE strengthening exercises and is now able to perform a body weight squat with normal mechanics and no pain. She remains unable to tolerate lateral step downs on the L due to pain but is able to perform 4\" on the R, requiring minimal cueing to prevent valgus collapse of knee. Goal/Measure of Progress Goal Met? 1. Patient to be independent and compliant with initial HEP to prevent further disability. Status at last Eval: Established Current Status: I with HEP yes   2. Improve B hip abd strength to 4-/5 MMT in order to perform squat with normal mechanics. Status at last Eval: 3+/5 Current Status: 4-/5 yes   3. Patient will report decreased c/o pain to < or = 1/10 to facilitate ease with walking 30 min with manageable sx in B knees. Status at last Eval: 8/10 Current Status: 5/10 progressing     New Goals to be achieved in __3-4__  weeks:  1. Patient to be independent & compliant with a progressive, high level HEP in order to maintain gains made in physical therapy. 2.  Patient will demonstrate good eccentric quad control in lateral step down off 6'' step with no compensation to facilitate normalized gait pattern for stair negotiation.    3.  STG3 RECOMMENDATIONS  Continue therapy per initial plan/protocol    Specifics: The patient could benefit from continued PT 2-3x/week for 3-4 weeks to progress towards achieving all LTGs. If you have any questions/comments please contact us directly at [phone]. Thank you for allowing us to assist in the care of your patient. Therapist Signature: Florencio Pike, PT, DPT, MTC, CMTPT Date: 10/14/19   Reporting Period: NA Time: 11:20 AM   NOTE TO PHYSICIAN:  PLEASE COMPLETE THE ORDERS BELOW AND FAX TO   [facility]: [phone]  If you are unable to process this request in 24 hours please contact our office: [phone]    ___ I have read the above report and request that my patient continue as recommended.   ___ I have read the above report and request that my patient continue therapy with the following changes/special instructions:_________________________________________________________   ___ I have read the above report and request that my patient be discharged from therapy.      Physician Signature:        Date:       Time:

## 2019-11-25 ENCOUNTER — APPOINTMENT (OUTPATIENT)
Dept: PHYSICAL THERAPY | Age: 54
End: 2019-11-25
Payer: COMMERCIAL

## 2021-11-10 ENCOUNTER — HOSPITAL ENCOUNTER (OUTPATIENT)
Dept: PHYSICAL THERAPY | Age: 56
Discharge: HOME | End: 2021-11-10
Payer: COMMERCIAL

## 2021-11-10 PROCEDURE — 97110 THERAPEUTIC EXERCISES: CPT

## 2021-11-10 PROCEDURE — 97161 PT EVAL LOW COMPLEX 20 MIN: CPT

## 2021-11-10 PROCEDURE — 97162 PT EVAL MOD COMPLEX 30 MIN: CPT

## 2021-11-10 NOTE — PROGRESS NOTES
[facility] [address] [place] [address]  [address], [address], [address]  Phone: [phone]  Fax: [phone] / [address] [address] PHYSICAL THERAPY SERVICES  Patient Name: Mielna Escobedo : 1965   Medical   Diagnosis: Right knee pain [M25.561] Treatment Diagnosis: S/p R TKR    Onset Date: 10-11-21     Referral Source: Miles Monsivais MD [facility]): 11/10/2021   Prior Hospitalization: See medical history Provider #: [id number]   Prior Level of Function: Powerwalking 3-4 times/week, 30-40 mins/day (~   Comorbidities: L TKR   Medications: Verified on Patient Summary List   The Plan of Care and following information is based on the information from the initial evaluation.   ==================================================================================  Assessment / key information:  Patient is a pleasant 55 y.o. female who presents to [facility] s/p R TKR on 10-11-21. She stayed overnight for 1 night at the hospital & was recently DC from [facility]. She recently RTW on 10-24-21 (she works remotely) & had an emergency appendectomy on 10-26-21. Current c/o pain in R knee are intermittent in nature & worsen with activities such as descending stairs & when rising from chairs after periods of prolonged sitting. Sx improve with ice or use of ice machine at home. Average reported pain level at 4/10, 6/10 at worst & 0/10 at best.  Upon objective evaluation, patient demonstrates R knee ROM as follows 3-120 degrees as measured in supine with mild ERP with flex>ext. MMT revealed  Overall R knee strength at 4/5 with no c/o pain upon MMT. Incision appears to be intact & healing well, slight decreased mobility along distal incision.    Patient can benefit PT interventions to improve ROM, decrease pain & swelling  to facilitate return to unlimited ADLs, work activities & overall functional status.   ==================================================================================  Eval Complexity: History MEDIUM  Complexity : 1-2 comorbidities / personal factors will impact the outcome/ POC ;  Examination  MEDIUM Complexity : 3 Standardized tests and measures addressing body structure, function, activity limitation and / or participation in recreation ; Presentation MEDIUM Complexity : Evolving with changing characteristics ; Decision Making MEDIUM Complexity : FOTO score of 26-74; Overall Complexity MEDIUM  Problem List: pain affecting function, decrease ROM, decrease strength, edema affecting function, impaired gait/ balance, decrease ADL/ functional abilitiies, decrease activity tolerance, decrease flexibility/ joint mobility and decrease transfer abilities   Treatment Plan may include any combination of the following: Therapeutic exercise, Therapeutic activities, Neuromuscular re-education, Physical agent/modality, Gait/balance training, Manual therapy, Patient education, Self Care training, Functional mobility training, Home safety training and Stair training  Patient / Family readiness to learn indicated by: asking questions, trying to perform skills and interest  Persons(s) to be included in education: patient (P)  Barriers to Learning/Limitations: None  Measures taken:    Patient Goal (s): \"decreased pain levels, strengthening of the knee, regain full extension, break up scar tissue\"   Patient self reported health status: good  Rehabilitation Potential: excellent   Short Term Goals: To be accomplished in  2  weeks:  1) Establish HEP to prevent further disability. 2) Patient will report decreased c/o pain to < or = 1/10 to facilitate sleeping at night > 5 hours uninterrupted with manageable sx in R knee. 3) Improve FOTO score from 38 points to > or = 48 points indicating improved tolerance with ADLs in regards to R knee.   4) Patient to demonstrate full passive extension of  R knee to improve gait mechanics with decreased extensor lag on R.   Long Term Goals: To be accomplished in  4  weeks:  1) Improve FOTO score from 48 points to > or = 66 points indicating improved tolerance with ADLs in regards to R knee. 2) Patient to be able to perform FWD step down from 4-6 inch step with good pelvis/knee stability & no c/o pain in preparation for return to rec stair negotiation. 3) Improve overall strength of R knee to 5-/5 with no c/o pain upon MMT so strength is available for return to PLOF. 4) Patient will ambulate with a normalized gait pattern without extensor lag on R for limited community ambulation. Frequency / Duration:   Patient to be seen  2-3  times per week for 4  weeks:  Patient / Caregiver education and instruction: self care, activity modification, brace/ splint application and exercises    Therapist Signature: SABRINA Washington cert MDT Date: 11/74/1973   Certification Period: None Time: 12:06 PM   =================================================================================  I certify that the above Physical Therapy Services are being furnished while the patient is under my care. I agree with the treatment plan and certify that this therapy is necessary.     Physician Signature:                                                             Date:                                     Time:                                                                       Nadeen Lefort,*

## 2021-11-11 NOTE — PROGRESS NOTES
PHYSICAL THERAPY - DAILY TREATMENT NOTE    Patient Name: Paula Montilla        Date: 11/10/2021  : 1965   YES Patient  Verified  Visit #:     Insurance: Payor: Della Cheatham / Plan: [employer] PPO / Product Type: PPO /      In time: 11:50 A Out time: 12:45 P   Total Treatment Time: 55     BCBS/Medicare Time Tracking (below)   Total Timed Codes (min):  NA 1:1 Treatment Time:  NA     TREATMENT AREA = Right knee pain [M25.561]    SUBJECTIVE  Pain Level (on 0 to 10 scale):  0  / 10   Medication Changes/New allergies or changes in medical history, any new surgeries or procedures?     NO    If yes, update Summary List   Subjective Functional Status/Changes:  []  No changes reported     See POC          Modalities Rationale:     decrease edema, decrease inflammation and decrease pain to improve patient's ability to return to pain-free ADLs   min [] Estim, type/location:                                      []  att     []  unatt     []  w/US     []  w/ice    []  w/heat    min []  Mechanical Traction: type/lbs                   []  pro   []  sup   []  int   []  cont    []  before manual    []  after manual    min []  Ultrasound, settings/location:      min []  Iontophoresis w/ dexamethasone, location:                                               []  take home patch       []  in clinic   10 min [x]  Ice     []  Heat    location/position: Supine to R knee    min []  Vasopneumatic Device, press/temp:    If using vaso (only need to measure limb vaso being performed on)      pre-treatment girth :       post-treatment girth :       measured at (landmark location) :      min []  Other:    [] Skin assessment post-treatment (if applicable):    [x]  intact    [x]  redness- no adverse reaction                  []redness  adverse reaction:        10 min Therapeutic Exercise:  [x]  See flow sheet   Rationale:      increase ROM and increase strength to improve the patients ability to return to pain-free standing Billed With/As:   [] TE   [] TA   [] Neuro   [] Self Care Patient Education: [x] Review HEP    [] Progressed/Changed HEP based on:   [] positioning   [] body mechanics   [] transfers   [] heat/ice application    [] other:      Other Objective/Functional Measures:    See POC     Post Treatment Pain Level (on 0 to 10) scale:   0  / 10     ASSESSMENT  Assessment/Changes in Function:     See POC     []  See Progress Note/Recertification   Patient will continue to benefit from skilled PT services to modify and progress therapeutic interventions, address functional mobility deficits, address ROM deficits, address strength deficits, analyze and cue movement patterns, analyze and modify body mechanics/ergonomics, assess and modify postural abnormalities and instruct in home and community integration to attain remaining goals.    Progress toward goals / Updated goals:    Progressing towards goals established at [address] [facility] [address]  []  Upgrade activities as tolerated YES Continue plan of care   []  Discharge due to :    []  Other:      Therapist: German Lewis PT    Date: 11/10/2021 Time: 12:43 PM     Future Appointments   Date Time Provider China Charles   11/16/2021  2:00 PM Willshire Latterrie, PT [facility]   11/18/2021  2:00 PM Suzy Latterrie, PT [facility]   11/23/2021  2:45 PM [facility] PT TORRI Magdaleno [facility]   11/26/2021 12:45 PM Denzil Mcburney, PT [facility]   11/29/2021 12:30 PM Suzy Latterrie, PT [facility]   12/2/2021  2:00 PM Willshire Lathe, PT [facility]   12/7/2021  2:45 PM Suzy Lathe, PT [facility]   12/9/2021  3:00 PM Willshire Lathe, PT [facility]   12/14/2021  2:45 PM Suzy Sanchez, PT [facility]   12/16/2021  2:00 PM Carlo Schuster, PT [facility]

## 2021-11-16 ENCOUNTER — APPOINTMENT (OUTPATIENT)
Dept: PHYSICAL THERAPY | Age: 56
End: 2021-11-16
Payer: COMMERCIAL

## 2021-11-18 ENCOUNTER — HOSPITAL ENCOUNTER (OUTPATIENT)
Dept: PHYSICAL THERAPY | Age: 56
Discharge: HOME | End: 2021-11-18
Payer: COMMERCIAL

## 2021-11-18 PROCEDURE — 97530 THERAPEUTIC ACTIVITIES: CPT

## 2021-11-18 PROCEDURE — 97110 THERAPEUTIC EXERCISES: CPT

## 2021-11-19 NOTE — PROGRESS NOTES
PHYSICAL THERAPY - DAILY TREATMENT NOTE    Patient Name: Lina Underwood        Date: 2021  : 1965   YES Patient  Verified  Visit #:     Insurance: Payor: Yancy Pham / Plan: [place] [employer] / Product Type: PPO /      In time: 5:45 P Out time: 6:50 P   Total Treatment Time: 60     BCBS/Medicare Time Tracking (below)   Total Timed Codes (min):  NA 1:1 Treatment Time:  NA     TREATMENT AREA =  Right knee pain [M25.561]  SUBJECTIVE  Pain Level (on 0 to 10 scale):  0  / 10   Medication Changes/New allergies or changes in medical history, any new surgeries or procedures? NO    If yes, update Summary List   Subjective Functional Status/Changes:  []  No changes reported     Patient reports that she resumed her walking program this week & was walk able to walk 30 minutes, three different days. She had some discomfort on the back of her knee that she feels sporadically but she has been sleeping better.             Modalities Rationale:     decrease edema, decrease inflammation and decrease pain to improve patient's ability to return to pain-free ADLs   min [] Estim, type/location:                                      []  att     []  unatt     []  w/US     []  w/ice    []  w/heat    min []  Mechanical Traction: type/lbs                   []  pro   []  sup   []  int   []  cont    []  before manual    []  after manual    min []  Ultrasound, settings/location:      min []  Iontophoresis w/ dexamethasone, location:                                               []  take home patch       []  in clinic   10 min [x]  Ice     []  Heat    location/position: Supine to R knee     min []  Vasopneumatic Device, press/temp:    If using vaso (only need to measure limb vaso being performed on)      pre-treatment girth :       post-treatment girth :       measured at (landmark location) :      min []  Other:    [] Skin assessment post-treatment (if applicable):    [x]  intact    [x]  redness- no adverse reaction []redness  adverse reaction:        35 min Therapeutic Exercise:  [x]  See flow sheet   Rationale:      increase ROM, increase strength, improve balance and increase proprioception to improve the patients ability to return to pain-free standing when cooking      15 min Therapeutic Activity: [x]  See flow sheet   Rationale:    increase ROM and increase strength to improve the patients ability to return to reciprocal stair negotiation      Billed With/As:   [] TE   [] TA   [] Neuro   [] Self Care Patient Education: [x] Review HEP    [] Progressed/Changed HEP based on:   [] positioning   [] body mechanics   [] transfers   [] heat/ice application    [] other:      Other Objective/Functional Measures:    Initiated exercises per flow sheet (see updated HEP, issued orange band)     Post Treatment Pain Level (on 0 to 10) scale:   0  / 10     ASSESSMENT  Assessment/Changes in Function:     Fatigue with supine SLR, difficulty with maintaining quad set with supine SLR no increase in pain today     []  See Progress Note/Recertification   Patient will continue to benefit from skilled PT services to modify and progress therapeutic interventions, address functional mobility deficits, address ROM deficits, address strength deficits, analyze and address soft tissue restrictions, analyze and cue movement patterns, analyze and modify body mechanics/ergonomics, assess and modify postural abnormalities, address imbalance/dizziness and instruct in home and community integration to attain remaining goals.    Progress toward goals / Updated goals:    Progressing towards STG 2 & 4     PLAN  []  Upgrade activities as tolerated YES Continue plan of care   []  Discharge due to :    []  Other:      Therapist: Luisa Meyer PT    Date: 11/18/2021 Time: 10:55 PM     Future Appointments   Date Time Provider [place] Charles   11/23/2021  2:45 PM [facility] [place] [facility]   11/26/2021 12:45 PM Kristene Severe, PT [facility] 11/29/2021 12:30 PM Hubert Clubs, PT [facility]   12/2/2021  2:00 PM Hubert Clubs, PT [facility]   12/7/2021  2:45 PM Stephenie Cox, PT [facility] [place] [facility]   12/9/2021  3:00 PM Hubert Clubs, PT [facility]   12/14/2021  2:45 PM Hubert Clubs, PT [facility]   12/16/2021  2:00 PM Ranjan Schuster, PT [facility]

## 2021-11-23 ENCOUNTER — HOSPITAL ENCOUNTER (OUTPATIENT)
Dept: PHYSICAL THERAPY | Age: 56
Discharge: HOME | End: 2021-11-23
Payer: COMMERCIAL

## 2021-11-23 PROCEDURE — 97530 THERAPEUTIC ACTIVITIES: CPT | Performed by: PHYSICAL THERAPIST

## 2021-11-23 PROCEDURE — 97110 THERAPEUTIC EXERCISES: CPT | Performed by: PHYSICAL THERAPIST

## 2021-11-23 NOTE — PROGRESS NOTES
PHYSICAL THERAPY - DAILY TREATMENT NOTE    Patient Name: Vianey Quintero        Date: 2021  : 1965   YES Patient  Verified  Visit #:   3   of   12  Insurance: Payor: Cyrus Grier / Plan: [place] [employer] PPO / Product Type: PPO /      In time: 250 P Out time: 344 P   Total Treatment Time: 54     BCBS/Medicare Time Tracking (below)   Total Timed Codes (min):  NA 1:1 Treatment Time:  NA     TREATMENT AREA =  Right knee pain [M25.561]  SUBJECTIVE  Pain Level (on 0 to 10 scale):  0  / 10   Medication Changes/New allergies or changes in medical history, any new surgeries or procedures? NO    If yes, update Summary List   Subjective Functional Status/Changes:  []  No changes reported     Patient reports. She didn't walk yesterday so sleeping was a little more challenging.  I can put pressure on it to get in bed now           Modalities Rationale:     decrease edema, decrease inflammation and decrease pain to improve patient's ability to return to pain-free ADLs   min [] Estim, type/location:                                      []  att     []  unatt     []  w/US     []  w/ice    []  w/heat    min []  Mechanical Traction: type/lbs                   []  pro   []  sup   []  int   []  cont    []  before manual    []  after manual    min []  Ultrasound, settings/location:      min []  Iontophoresis w/ dexamethasone, location:                                               []  take home patch       []  in clinic   10 min [x]  Ice     []  Heat    location/position: Supine to R knee     min []  Vasopneumatic Device, press/temp:    If using vaso (only need to measure limb vaso being performed on)      pre-treatment girth :       post-treatment girth :       measured at (landmark location) :      min []  Other:    [] Skin assessment post-treatment (if applicable):    [x]  intact    [x]  redness- no adverse reaction                   []redness  adverse reaction:        30 min Therapeutic Exercise:  [x]  See flow sheet Rationale:      increase ROM, increase strength, improve balance and increase proprioception to improve the patients ability to return to pain-free standing when cooking      14 min Therapeutic Activity: [x]  See flow sheet   Rationale:    increase ROM and increase strength to improve the patients ability to return to reciprocal stair negotiation      Billed With/As:   [] TE   [] TA   [] Neuro   [] Self Care Patient Education: [x] Review HEP    [] Progressed/Changed HEP based on:   [] positioning   [] body mechanics   [] transfers   [] heat/ice application    [] other:      Other Objective/Functional Measures:  progressed to 8 in step ups and 6 in tap downs  Added mini squats today with good form noted   Post Treatment Pain Level (on 0 to 10) scale:   0  / 10     ASSESSMENT  Assessment/Changes in Function:    Continues with mod fatigue with SLR flexion/ABD. Pt did well with progression of step ups/lateral step downs, and progression to mini squats with no pain. []  See Progress Note/Recertification   Patient will continue to benefit from skilled PT services to modify and progress therapeutic interventions, address functional mobility deficits, address ROM deficits, address strength deficits, analyze and address soft tissue restrictions, analyze and cue movement patterns, analyze and modify body mechanics/ergonomics, assess and modify postural abnormalities, address imbalance/dizziness and instruct in home and community integration to attain remaining goals.    Progress toward goals / Updated goals:    Progressing towards STG 2 & 4 , nearly MET STG 4     PLAN  []  Upgrade activities as tolerated YES Continue plan of care   []  Discharge due to :    []  Other:      Therapist: Mary Cheng PT    Date: 11/23/2021 Time: 10:55 PM     Future Appointments   Date Time Provider China Charles   11/23/2021  2:45 PM [facility] [place] [facility]   11/29/2021 12:30 PM Megan Winter PT [facility]   12/2/2021 2:00 PM Devorah Arzola [facility] [place] [facility]   12/7/2021  2:45 PM Ronna Emerson, PT [facility]   12/9/2021  3:00 PM Jung Dominguez, PT [facility]   12/14/2021  2:45 PM Jung Dominguez, PT [facility]   12/16/2021  2:00 PM Vito-Cruz, Rudean Phalen, PT [facility]

## 2021-11-26 ENCOUNTER — HOSPITAL ENCOUNTER (OUTPATIENT)
Dept: PHYSICAL THERAPY | Age: 56
End: 2021-11-26
Payer: COMMERCIAL

## 2021-11-29 ENCOUNTER — APPOINTMENT (OUTPATIENT)
Dept: PHYSICAL THERAPY | Age: 56
End: 2021-11-29
Payer: COMMERCIAL

## 2021-11-30 ENCOUNTER — APPOINTMENT (OUTPATIENT)
Dept: PHYSICAL THERAPY | Age: 56
End: 2021-11-30
Payer: COMMERCIAL

## 2021-12-02 ENCOUNTER — HOSPITAL ENCOUNTER (OUTPATIENT)
Dept: PHYSICAL THERAPY | Age: 56
Discharge: HOME | End: 2021-12-02
Payer: COMMERCIAL

## 2021-12-02 PROCEDURE — 97530 THERAPEUTIC ACTIVITIES: CPT

## 2021-12-02 PROCEDURE — 97110 THERAPEUTIC EXERCISES: CPT

## 2021-12-02 NOTE — PROGRESS NOTES
PHYSICAL THERAPY - DAILY TREATMENT NOTE    Patient Name: Milena Escobedo        Date: 2021  : 1965   YES Patient  Verified  Visit #:     Insurance: Payor: Alban Lung / Plan: [place] [employer] PPO / Product Type: PPO /      In time: 2:05 P Out time: 2:50 P   Total Treatment Time: 45     BCBS/Medicare Time Tracking (below)   Total Timed Codes (min):  NA 1:1 Treatment Time:  NA     TREATMENT AREA =  Right knee pain [M25.561]  SUBJECTIVE  Pain Level (on 0 to 10 scale):  0  / 10   Medication Changes/New allergies or changes in medical history, any new surgeries or procedures? NO    If yes, update Summary List   Subjective Functional Status/Changes:  []  No changes reported     Patient reports she is feeling much better, she is now able to sleep better & can put some weight on her knee to kneel when she is on the floor. She had some pain when she caught on her dog bed.           Modalities Rationale:   Patient deferred    min [] Estim, type/location:                                      []  att     []  unatt     []  w/US     []  w/ice    []  w/heat    min []  Mechanical Traction: type/lbs                   []  pro   []  sup   []  int   []  cont    []  before manual    []  after manual    min []  Ultrasound, settings/location:      min []  Iontophoresis w/ dexamethasone, location:                                               []  take home patch       []  in clinic    min [x]  Ice     []  Heat    location/position: Supine to R knee     min []  Vasopneumatic Device, press/temp:    If using vaso (only need to measure limb vaso being performed on)      pre-treatment girth :       post-treatment girth :       measured at (landmark location) :      min []  Other:    [] Skin assessment post-treatment (if applicable):    []  intact    []  redness- no adverse reaction                  []redness  adverse reaction:        30 min Therapeutic Exercise:  [x]  See flow sheet   Rationale:      increase ROM and increase strength to improve the patients ability to return to pain-free transfers into standing     15 min Therapeutic Activity: [x]  See flow sheet   Rationale:    increase ROM, increase strength, improve balance and increase proprioception to improve the patients ability to return to pain-free stair negotiation      Billed With/As:   [] TE   [] TA   [] Neuro   [] Self Care Patient Education: [x] Review HEP    [] Progressed/Changed HEP based on:   [] positioning   [] body mechanics   [] transfers   [] heat/ice application    [] other:      Other Objective/Functional Measures:    Progressed SLS to blue disc today, added forward step down off 4 inch      Post Treatment Pain Level (on 0 to 10) scale:   0  / 10     ASSESSMENT  Assessment/Changes in Function:     Good tolerance to today's progressions no increase in pain      []  See Progress Note/Recertification   Patient will continue to benefit from skilled PT services to modify and progress therapeutic interventions, address functional mobility deficits, address ROM deficits, address strength deficits, analyze and address soft tissue restrictions, analyze and cue movement patterns, analyze and modify body mechanics/ergonomics, assess and modify postural abnormalities and instruct in home and community integration to attain remaining goals.    Progress toward goals / Updated goals:    Progressing towards STG 3 & STG 1, 2     PLAN  []  Upgrade activities as tolerated YES Continue plan of care   []  Discharge due to :    []  Other:      Therapist: Colleen Veloz PT    Date: 12/2/2021 Time: 2:38 PM     Future Appointments   Date Time Provider China Charles   12/3/2021 11:45 AM Brendon Paige, PT [facility]   12/7/2021  2:45 PM Fadia Vance PT [facility]   12/9/2021  3:00 PM Brendon Paige, PT [facility]   12/14/2021  2:45 PM Brendon Paige PT [facility]   12/16/2021  2:00 PM Tomasa Schuster, PT [facility]

## 2021-12-03 ENCOUNTER — APPOINTMENT (OUTPATIENT)
Dept: PHYSICAL THERAPY | Age: 56
End: 2021-12-03
Payer: COMMERCIAL

## 2021-12-07 ENCOUNTER — APPOINTMENT (OUTPATIENT)
Dept: PHYSICAL THERAPY | Age: 56
End: 2021-12-07
Payer: COMMERCIAL

## 2021-12-09 ENCOUNTER — APPOINTMENT (OUTPATIENT)
Dept: PHYSICAL THERAPY | Age: 56
End: 2021-12-09
Payer: COMMERCIAL

## 2021-12-16 ENCOUNTER — APPOINTMENT (OUTPATIENT)
Dept: PHYSICAL THERAPY | Age: 56
End: 2021-12-16
Payer: COMMERCIAL

## 2022-01-20 ENCOUNTER — APPOINTMENT (OUTPATIENT)
Dept: PHYSICAL THERAPY | Age: 57
End: 2022-01-20
Payer: COMMERCIAL

## 2022-01-24 ENCOUNTER — HOSPITAL ENCOUNTER (OUTPATIENT)
Dept: PHYSICAL THERAPY | Age: 57
Discharge: HOME | End: 2022-01-24
Payer: COMMERCIAL

## 2022-01-24 PROCEDURE — 97162 PT EVAL MOD COMPLEX 30 MIN: CPT

## 2022-01-24 PROCEDURE — 97110 THERAPEUTIC EXERCISES: CPT

## 2022-01-24 NOTE — PROGRESS NOTES
[address] [facility] PHYSICAL THERAPY AT [address] [place] [address]  Moy Brandon [facility], [address] ,[address], [address]  Phone: [phone]  Fax: [phone] / [address] [address] PHYSICAL THERAPY SERVICES  Patient Name: Sommer Childs : 1965   Medical   Diagnosis: Left knee pain [M25.562] Treatment Diagnosis: S/P L TKA   Onset Date: 21     Referral Source: Osei Schilling MD [place] [facility]): 2022   Prior Hospitalization: See medical history Provider #: [id number]   Prior Level of Function: Recreational walking, daily workouts   Comorbidities: R partial knee arthroscopy 10/11/21   Medications: Verified on Patient Summary List   The Plan of Care and following information is based on the information from the initial evaluation.   ===========================================================================================  Assessment / key information: Patient is a 65 y.o. female who presents to [facility] at [facility] S/P L partial knee arthroplasty on 21. The patient reports she received HHPT for 2 weeks following surgery. She notes significant inc in swelling approx 2 weeks post op and was prescribed prednisone. She is currently walking without an AD and is able to ascend stairs with reciprocla gait pattern. Objective PT examination findings include:   AROM: L knee 0-132°  MMT: good quad set, no extensor lag during SLR, L hip abd 3+/5  Palpation: mild swelling throughout knee and lower leg  Special Tests: L SLS 10s (cueing for full knee ext)    A home exercise program was demonstrated and provided to address the above objective and functional deficits.  Patient can benefit from skilled PT interventions to improve strength, decrease pain/swelling, to facilitate performance of ADLs & improve overall functional status.   ===========================================================================================  Eval Complexity: History MEDIUM  Complexity : 1-2 comorbidities / personal factors will impact the outcome/ POC ;  Examination  MEDIUM Complexity : 3 Standardized tests and measures addressing body structure, function, activity limitation and / or participation in recreation ; Presentation MEDIUM Complexity : Evolving with changing characteristics ; Decision Making MEDIUM Complexity : FOTO score of 26-74; Overall Complexity MEDIUM  Problem List: pain affecting function, decrease ROM, decrease strength, edema affecting function, impaired gait/ balance, decrease ADL/ functional abilitiies, decrease activity tolerance, decrease flexibility/ joint mobility and decrease transfer abilities, FOTO score 65  Treatment Plan may include any combination of the following: Therapeutic exercise, Therapeutic activities, Neuromuscular re-education, Physical agent/modality, Gait/balance training, Manual therapy including dry needling, Aquatic therapy and Patient education  Patient / Family readiness to learn indicated by: asking questions, trying to perform skills and interest  Persons(s) to be included in education: patient (P)  Barriers to Learning/Limitations: no  Measures taken:    Patient Goal (s): \"less pain, strength, flexibility\"   Patient self reported health status: good  Rehabilitation Potential: good   Short Term Goals: To be accomplished in  1-2  weeks:  1) Patient to be independent and compliant with initial HEP to prevent further disability. 2) Patient to demonstrate normal gait mechanics when walking with no signs of antalgia. 4) Patient to perform strong quad set and demonstrate no lag during SLR in order to maintain stable TKE when walking.  Long Term Goals: To be accomplished in  3-4  weeks:  1) Patient to be independent & compliant with a progressive, high level HEP in order to maintain gains made in physical therapy. 2) Improve FOTO score to 81 indicating significant functional improvement in order to return to PLOF.   3) Patient will demonstrate good eccentric quad control in lateral step down off 6'' step with no compensation to facilitate normalized gait pattern for stair negotiation. Frequency / Duration:   Patient to be seen  2-3  times per week for 3-4  weeks:  Patient / Caregiver education and instruction: self care, activity modification and exercises    Therapist Signature: Charisse Snyder PT, DPT, MTC, CMTPT Date: 6/90/7294   Certification Period: NA Time: 4:16 PM   ===========================================================================================  I certify that the above Physical Therapy Services are being furnished while the patient is under my care. I agree with the treatment plan and certify that this therapy is necessary. Physician Signature:        Date:       Time:              Steve Hadley MD  Please sign and return to [facility] or you may fax the signed copy to [phone]. Thank you.

## 2022-01-24 NOTE — PROGRESS NOTES
PHYSICAL THERAPY - DAILY TREATMENT NOTE    Patient Name: Rae Raymond        Date: 2022  : 1965   YES Patient  Verified  Visit #:     Insurance: Payor: Carlos Martinez / Plan: [place] [employer] PPO / Product Type: PPO /      In time: 355 Out time: 450   Total Treatment Time: 50     BCBS/Medicare Time Tracking (below)   Total Timed Codes (min):   1:1 Treatment Time:       TREATMENT AREA =  Left knee pain [M25.562]    SUBJECTIVE  Pain Level (on 0 to 10 scale):  3  / 10   Medication Changes/New allergies or changes in medical history, any new surgeries or procedures? NO    If yes, update Summary List   Subjective Functional Status/Changes:  []  No changes reported   The patient reports she received HHPT for 2 weeks following surgery. She notes significant inc in swelling approx 2 weeks post op and was prescribed prednisone. She is currently walking without an AD and is able to ascend stairs with reciprocla gait pattern.        Modalities Rationale:     decrease inflammation, decrease pain and increase tissue extensibility to improve patient's ability to perform ADLs   min [] Estim, type/location:                                     []  att     []  unatt     []  w/US     []  w/ice    []  w/heat    min []  Mechanical Traction: type/lbs                   []  pro   []  sup   []  int   []  cont    []  before manual    []  after manual    min []  Ultrasound, settings/location:      min []  Iontophoresis w/ dexamethasone, location:                                               []  take home patch       []  in clinic   10 min [x]  Ice     []  Heat    location/position: L knee in supine    min []  Vasopneumatic Device, press/temp: If using vaso (only need to measure limb vaso being performed on)      pre-treatment girth :       post-treatment girth :       measured at (landmark location) :      min []  Other:    [x] Skin assessment post-treatment (if applicable):    [x]  intact    [x]  redness- no adverse reaction     []redness  adverse reaction:        10 min Therapeutic Exercise:  [x]  See flow sheet   Rationale:      increase ROM and increase strength to improve the patients ability to perform unlimited ADLs      min Therapeutic Activity: [x]  See flow sheet   Rationale:    increase ROM, increase strength, and improve coordination to improve the patients ability to squat and negotiate stairs      Billed With/As:   [x] TE   [] TA   [] Neuro   [] Self Care Patient Education: [x] Review HEP    [] Progressed/Changed HEP based on:   [] positioning   [] body mechanics   [] transfers   [] heat/ice application    [x] other: Issued written HEP and reviewed importance of activity limitation to manage pain/swelling and allow for adequate healing     Other Objective/Functional Measures:    AROM: L knee 0-132°  MMT: good quad set, no extensor lag during SLR, L hip abd 3+/5  Palpation: mild swelling throughout knee and lower leg  Special Tests: L SLS 10s (cueing for full knee ext)     Post Treatment Pain Level (on 0 to 10) scale:   3  / 10     ASSESSMENT  Assessment/Changes in Function:     See POC     []  See Progress Note/Recertification   Patient will continue to benefit from skilled PT services to modify and progress therapeutic interventions, address functional mobility deficits, address ROM deficits, address strength deficits, analyze and address soft tissue restrictions, analyze and cue movement patterns, analyze and modify body mechanics/ergonomics, assess and modify postural abnormalities, address imbalance/dizziness and instruct in home and community integration to attain remaining goals.    Progress toward goals / Updated goals:    Progressing towards newly established goals in [address] [facility] [id number]  [x]  Upgrade activities as tolerated YES Continue plan of care   []  Discharge due to :    []  Other:      Therapist: Eneida Aguayo, PT, DPT, MTC, CMTPT    Date: 1/24/2022 Time: 4:02 PM     Future Appointments   Date Time Provider [place] Charles   1/28/2022 11:00 AM Kirit Coelho, PT [facility] [place] [facility]   2/1/2022  2:15 PM Kirit Coelho, PT [facility]   2/3/2022  2:45 PM Kirit Coelho, PT [facility]   2/8/2022  2:15 PM Kirit Coelho, PT [facility]   2/10/2022  2:45 PM Kirit Coehlo, PT [facility]   2/15/2022  2:15 PM Kirit Coelho, PT [facility]   2/17/2022  2:45 PM Kirit Coelho, PT [facility]   2/22/2022  2:15 PM Kirit Coelho, PT [facility]   2/24/2022  2:45 PM Dacia Schuster, PT [facility]

## 2022-01-26 ENCOUNTER — APPOINTMENT (OUTPATIENT)
Dept: PHYSICAL THERAPY | Age: 57
End: 2022-01-26
Payer: COMMERCIAL

## 2022-01-28 ENCOUNTER — APPOINTMENT (OUTPATIENT)
Dept: PHYSICAL THERAPY | Age: 57
End: 2022-01-28
Payer: COMMERCIAL

## 2022-02-01 ENCOUNTER — HOSPITAL ENCOUNTER (OUTPATIENT)
Dept: PHYSICAL THERAPY | Age: 57
Discharge: HOME | End: 2022-02-01
Payer: COMMERCIAL

## 2022-02-01 PROCEDURE — 97530 THERAPEUTIC ACTIVITIES: CPT

## 2022-02-01 PROCEDURE — 97110 THERAPEUTIC EXERCISES: CPT

## 2022-02-01 NOTE — PROGRESS NOTES
PHYSICAL THERAPY - DAILY TREATMENT NOTE    Patient Name: Sharmon Caller        Date: 2022  : 1965   YES Patient  Verified  Visit #:     Insurance: Payor: Brendan Hadley / Plan: [employer] PPO / Product Type: PPO /      In time: 2:15 P Out time: 3:00 P   Total Treatment Time: 45     BCBS/Medicare Time Tracking (below)   Total Timed Codes (min):  NA 1:1 Treatment Time:  NA     TREATMENT AREA =  Left knee pain [M25.562]  SUBJECTIVE  Pain Level (on 0 to 10 scale):  2  / 10   Medication Changes/New allergies or changes in medical history, any new surgeries or procedures? NO    If yes, update Summary List   Subjective Functional Status/Changes:  []  No changes reported     Patient reports she had a PVL on L 2 week ago because she had a lot of swelling in her lower shin, but she believes it was from wearing heels from a .  Her US was (-). Next f/u with MD on 22. She walked on a TM at the gym for a few minutes at 3.5 incline.            Modalities Rationale:    Patient deferred   min [] Estim, type/location:                                      []  att     []  unatt     []  w/US     []  w/ice    []  w/heat    min []  Mechanical Traction: type/lbs                   []  pro   []  sup   []  int   []  cont    []  before manual    []  after manual    min []  Ultrasound, settings/location:      min []  Iontophoresis w/ dexamethasone, location:                                               []  take home patch       []  in clinic    min []  Ice     []  Heat    location/position:     min []  Vasopneumatic Device, press/temp:    If using vaso (only need to measure limb vaso being performed on)      pre-treatment girth :       post-treatment girth :       measured at (landmark location) :      min []  Other:    [] Skin assessment post-treatment (if applicable):    []  intact    []  redness- no adverse reaction                  []redness  adverse reaction:        30 min Therapeutic Exercise:  [x]  See flow sheet   Rationale:      increase ROM and increase strength to improve the patients ability to return to pain-free ADLs     15 min Therapeutic Activity: [x]  See flow sheet   Rationale:    increase ROM, increase strength, improve coordination, improve balance and increase proprioception to improve the patients ability to return to pain-free walking program      Billed With/As:   [] TE   [] TA   [] Neuro   [] Self Care Patient Education: [x] Review HEP    [] Progressed/Changed HEP based on:   [] positioning   [] body mechanics   [] transfers   [] heat/ice application    [] other:      Other Objective/Functional Measures:    Initiated exercises per flow sheet (see updated HEP)     Post Treatment Pain Level (on 0 to 10) scale:   1  / 10     ASSESSMENT  Assessment/Changes in Function:     Good tolerance to initial program      []  See Progress Note/Recertification   Patient will continue to benefit from skilled PT services to modify and progress therapeutic interventions, address functional mobility deficits, address ROM deficits, address strength deficits, analyze and address soft tissue restrictions, analyze and cue movement patterns, analyze and modify body mechanics/ergonomics, assess and modify postural abnormalities, address imbalance/dizziness and instruct in home and community integration to attain remaining goals.    Progress toward goals / Updated goals:    Progressing towards goals established at [address] [facility] [id number]  []  Upgrade activities as tolerated YES Continue plan of care   []  Discharge due to :    []  Other:      Therapist: Marleny Champion, PT    Date: 2/1/2022 Time: 2:59 PM     Future Appointments   Date Time Provider [place] Charles   2/3/2022  2:45 PM Tigist Curet [facility]   2/8/2022  2:15 PM Cindy Boyce PT [facility]   2/10/2022  2:45 PM Tigist Curet [facility]   2/15/2022  2:15 PM Cindy Boyce PT [facility]   2/17/2022  2:45 PM Lindy Schuster, PT [facility]   2/22/2022  2:15 PM Bee Fitzgerald, PT [facility]   2/24/2022  2:45 PM Kitty Schuster, PT [facility]

## 2022-02-03 ENCOUNTER — HOSPITAL ENCOUNTER (OUTPATIENT)
Dept: PHYSICAL THERAPY | Age: 57
Discharge: HOME | End: 2022-02-03
Payer: COMMERCIAL

## 2022-02-03 PROCEDURE — 97530 THERAPEUTIC ACTIVITIES: CPT

## 2022-02-03 PROCEDURE — 97110 THERAPEUTIC EXERCISES: CPT

## 2022-02-03 NOTE — PROGRESS NOTES
PHYSICAL THERAPY - DAILY TREATMENT NOTE    Patient Name: Doretha Nava        Date: 2/3/2022  : 1965   YES Patient  Verified  Visit #:   3   of   12  Insurance: Payor: Olimpia Yanez / Plan: [place] [employer] / Product Type: PPO /      In time: 2:55 P Out time: 3:45 P    Total Treatment Time: 45     BCBS/Medicare Time Tracking (below)   Total Timed Codes (min):  NA 1:1 Treatment Time:  NA     TREATMENT AREA =  Left knee pain [M25.562]  SUBJECTIVE  Pain Level (on 0 to 10 scale):  4  / 10   Medication Changes/New allergies or changes in medical history, any new surgeries or procedures? NO    If yes, update Summary List   Subjective Functional Status/Changes:  []  No changes reported     Patient reports she went to the gym & she thinks she did too much, she didn't stretch before she walked like she should have.             Modalities Rationale:    Patient deferred   min [] Estim, type/location:                                      []  att     []  unatt     []  w/US     []  w/ice    []  w/heat    min []  Mechanical Traction: type/lbs                   []  pro   []  sup   []  int   []  cont    []  before manual    []  after manual    min []  Ultrasound, settings/location:      min []  Iontophoresis w/ dexamethasone, location:                                               []  take home patch       []  in clinic    min []  Ice     []  Heat    location/position:     min []  Vasopneumatic Device, press/temp:    If using vaso (only need to measure limb vaso being performed on)      pre-treatment girth :       post-treatment girth :       measured at (landmark location) :      min []  Other:    [] Skin assessment post-treatment (if applicable):    []  intact    []  redness- no adverse reaction                  []redness  adverse reaction:        30 min Therapeutic Exercise:  [x]  See flow sheet   Rationale:      increase ROM and increase strength to improve the patients ability to return to pain-free standing     15 min Therapeutic Activity: [x]  See flow sheet   Rationale:    increase ROM and increase strength to improve the patients ability to return to pain-free walking program     Billed With/As:   [] TE   [] TA   [] Neuro   [] Self Care Patient Education: [x] Review HEP    [] Progressed/Changed HEP based on:   [] positioning   [] body mechanics   [] transfers   [] heat/ice application    [] other:      Other Objective/Functional Measures: Added prone hip extension/knee flexion, progressed PREs per flow sheet      Post Treatment Pain Level (on 0 to 10) scale:   3  / 10     ASSESSMENT  Assessment/Changes in Function:     Mild c/o posterior knee pain with step ups today, improved tolerance to 4 inch step, v/c to avoid extensor lag      []  See Progress Note/Recertification   Patient will continue to benefit from skilled PT services to modify and progress therapeutic interventions, address functional mobility deficits, address ROM deficits, address strength deficits, analyze and address soft tissue restrictions, analyze and cue movement patterns, analyze and modify body mechanics/ergonomics, assess and modify postural abnormalities and instruct in home and community integration to attain remaining goals.    Progress toward goals / Updated goals:    Progressing towards STG 2, 4      PLAN  []  Upgrade activities as tolerated YES Continue plan of care   []  Discharge due to :    []  Other:      Therapist: Carlos Enrique Wilson PT    Date: 2/3/2022 Time: 3:04 PM     Future Appointments   Date Time Provider China Charles   2/8/2022  2:15 PM Libra Saha, PT [facility]   2/10/2022  2:45 PM Libra Saha, PT [facility]   2/15/2022  2:15 PM Libra Saha, PT [facility]   2/17/2022  2:45 PM Libra Saha, PT [facility]   2/22/2022  2:15 PM Libra Saha, PT [facility]   2/24/2022  2:45 PM Andres Schuster, PT [facility]

## 2022-02-08 ENCOUNTER — APPOINTMENT (OUTPATIENT)
Dept: PHYSICAL THERAPY | Age: 57
End: 2022-02-08
Payer: COMMERCIAL

## 2022-02-09 ENCOUNTER — APPOINTMENT (OUTPATIENT)
Dept: PHYSICAL THERAPY | Age: 57
End: 2022-02-09
Payer: COMMERCIAL

## 2022-02-10 ENCOUNTER — HOSPITAL ENCOUNTER (OUTPATIENT)
Dept: PHYSICAL THERAPY | Age: 57
Discharge: HOME | End: 2022-02-10
Payer: COMMERCIAL

## 2022-02-10 PROCEDURE — 97530 THERAPEUTIC ACTIVITIES: CPT

## 2022-02-10 PROCEDURE — 97110 THERAPEUTIC EXERCISES: CPT

## 2022-02-10 NOTE — PROGRESS NOTES
PHYSICAL THERAPY - DAILY TREATMENT NOTE    Patient Name: Tara Delcid        Date: 2/10/2022  : 1965   YES Patient  Verified  Visit #:     Insurance: Payor: Jorge Mims / Plan: [place] [employer] / Product Type: PPO /      In time: 2:50 P Out time: 3:40 P   Total Treatment Time: 50     BCBS/Medicare Time Tracking (below)   Total Timed Codes (min):  NA 1:1 Treatment Time:  NA     TREATMENT AREA =  Left knee pain [M25.562]  SUBJECTIVE  Pain Level (on 0 to 10 scale):  4  / 10   Medication Changes/New allergies or changes in medical history, any new surgeries or procedures?     NO    If yes, update Summary List   Subjective Functional Status/Changes:  []  No changes reported     Patient reports she called her MD because her knee was still bothering, she had a f/u with MD on Monday & he said she needed to rest.  He prescribed a muscle relaxer (unsure name, generic for flexeril)         Modalities Rationale:    Patient deferred   min [] Estim, type/location:                                      []  att     []  unatt     []  w/US     []  w/ice    []  w/heat    min []  Mechanical Traction: type/lbs                   []  pro   []  sup   []  int   []  cont    []  before manual    []  after manual    min []  Ultrasound, settings/location:      min []  Iontophoresis w/ dexamethasone, location:                                               []  take home patch       []  in clinic    min []  Ice     []  Heat    location/position:     min []  Vasopneumatic Device, press/temp:    If using vaso (only need to measure limb vaso being performed on)      pre-treatment girth :       post-treatment girth :       measured at (landmark location) :      min []  Other:    [] Skin assessment post-treatment (if applicable):    []  intact    []  redness- no adverse reaction                  []redness  adverse reaction:        35 min Therapeutic Exercise:  [x]  See flow sheet   Rationale:      increase ROM and increase strength to improve the patients ability to return to pain-free standing with = weightbearing     15 min Therapeutic Activity: [x]  See flow sheet   Rationale:    increase ROM, increase strength, improve balance and increase proprioception to improve the patients ability to return to ambulation with decreased extensor lag     Billed With/As:   - TE   - TA   - Neuro   - Self Care Patient Education: - Review HEP    - Progressed/Changed HEP based on:   - positioning   - body mechanics   - transfers   - heat/ice application    - other:      Other Objective/Functional Measures:    Modified exercises per flow sheet      Post Treatment Pain Level (on 0 to 10) scale:   3  / 10     ASSESSMENT  Assessment/Changes in Function:     C/o pain with resisted knee flexion today (held prone knee flexion)     []  See Progress Note/Recertification   Patient will continue to benefit from skilled PT services to modify and progress therapeutic interventions, address functional mobility deficits, address ROM deficits, address strength deficits, analyze and address soft tissue restrictions, analyze and cue movement patterns, analyze and modify body mechanics/ergonomics, assess and modify postural abnormalities, address imbalance/dizziness and instruct in home and community integration to attain remaining goals.    Progress toward goals / Updated goals:    Progressing towards STG 2 & STG 4 met      PLAN  []  Upgrade activities as tolerated YES Continue plan of care   []  Discharge due to :    []  Other:      Therapist: Cyndy Mujica PT    Date: 2/10/2022 Time: 2:58 PM     Future Appointments   Date Time Provider China Charles   2/15/2022  2:15 PM Chasity Sampson, PT [facility]   2/17/2022  2:45 PM Chasity Sampson PT [facility]   2/22/2022  2:15 PM Chasity Sampson PT [facility]   2/24/2022  2:45 PM Junaid Schuster, PT [facility]

## 2022-02-15 ENCOUNTER — HOSPITAL ENCOUNTER (OUTPATIENT)
Dept: PHYSICAL THERAPY | Age: 57
Discharge: HOME | End: 2022-02-15
Payer: COMMERCIAL

## 2022-02-15 PROCEDURE — 97530 THERAPEUTIC ACTIVITIES: CPT

## 2022-02-15 PROCEDURE — 97110 THERAPEUTIC EXERCISES: CPT

## 2022-02-15 NOTE — PROGRESS NOTES
[address] [facility] PHYSICAL THERAPY AT [address] [place] [address]  [address], [address], [address] [facility] [address]  Phone: [phone]  Fax: [phone]  PROGRESS NOTE  Patient Name: Sandra Oshea : 1965   Treatment/Medical Diagnosis: Left knee pain [M25.562]   Referral Source: Benedicto Javier MD     Date of Initial Visit: 21 Attended Visits: 5 Missed Visits: 1     SUMMARY OF TREATMENT  Therapeutic exercise including ROM, stretching, gentle strengthening, gait & balance training, postural ed, patient education, HEP instruction, CP. CURRENT STATUS  Mrs. Anjana Gamboa continues to make steady progress with PT, she reports intermittent c/o pain in L knee with primary c/o pain in posterior knee. She was issued muscle relaxers on most recent f/u with MD on 22 & has only taken them on 2 occasions with no significant change in symptoms. Mild TTP along medial hamstrings insertion along posterior knee with additional c/o pain upon MMT of resisted knee flexion. Patient encouraged to resume taking muscle relaxers on a consistent basis. Otherwise she demonstrates improved gait mechanics & tolerance to step downs continues to improve. Goal/Measure of Progress Goal Met? 1. Patient to be independent and compliant with initial HEP to prevent further disability. Status at last Eval: NA Current Status: Good compliance with HEP yes   2. Patient to demonstrate normal gait mechanics when walking with no signs of antalgia. Status at last Eval: Signs of antalgia  Current Status: No signs of antalgia on L yes   3. Patient to perform strong quad set and demonstrate no lag during SLR in order to maintain stable TKE when walking. Status at last Eval: Ext lag on L Current Status: No extensor lag with weighted SLR yes   4. Improve FOTO score to 81 indicating significant functional improvement in order to return to PLOF.    Status at last Eval: 65 Current Status: No change (slight reduction to 57 points) progressing     New Goals to be achieved in __3-4__  weeks:  1. Improve FOTO score from 57 points to > or = 81 points indicating improved tolerance with ADLs in regards to L knee. 2.  Improve overall strength of L knee to 5/5 with no c/o pain upon MMT so strength is available for return to previous workout program.   3.  Patient will demonstrate good eccentric quad control in lateral step down off 6'' step with no compensation to facilitate normalized gait pattern for stair negotiation. 4.  Patient to be independent & compliant with HEP in preparation for D/C.       RECOMMENDATIONS  Patient to continue with PT for up to 3- 4 more weeks in order to progress towards achieving all LTGs. If you have any questions/comments please contact us directly at [phone]. Thank you for allowing us to assist in the care of your patient. Therapist Signature: SABRINA Lujan, cert MDT Date: 4/66/7258   Certification Period:  Reporting Period: None Time: 2:52 PM     NOTE TO PHYSICIAN:  PLEASE COMPLETE THE ORDERS BELOW AND FAX TO   [facility]: [phone]. If you are unable to process this request in 24 hours please contact our office: [phone].    ___ I have read the above report and request that my patient continue as recommended.   ___ I have read the above report and request that my patient continue therapy with the following changes/special instructions:_________________________________________________________   ___ I have read the above report and request that my patient be discharged from therapy.      Physician Signature:                                                             Date:                                     Time:                                                                       Charissa Frost MD

## 2022-02-15 NOTE — PROGRESS NOTES
Request for use of Dry Needling/Intramuscular Manual Therapy  Patient: Sommer Childs     Referral Source: Osei Schilling MD  Diagnosis: Left knee pain [M25.562]    : 1965  Date of initial visit: 22   Attended visits: 5  Missed Visits: 2    Based on findings from the physical therapy examination and evaluation, the evaluating therapist believes the patient, Sommer Childs  would benefit from including Dry Needling as part of the plan of care. Dry needling is a treatment technique utilized in conjunction with other PT interventions to inactivate myofascial trigger points and the pain and dysfunction they cause. Dry Needling is an advanced procedure that requires additional training of intensive course work. PROCEDURE:   Solid filament sterile needle (typically 0.3mm/30 gauge) inserted into a trigger point   Repeated movements inactivate the trigger points, taking 30-60 seconds per site   Typically consists of 1 dry needling session per week and a possible second treatment including muscle re-education, flexibility, strengthening and other manual techniques to facilitate the benefits of dry needling     BENEFITS:   Inactivation of trigger points   Decreased pain   Increased muscle length   Improved movement patterns   Restoration of function POTENTIAL RISKS:   Post-needling soreness   Infection   Bruising/bleeding   Penetration of a nerve   Pneumothorax   All treating PTs have been thoroughly educated in avoiding adverse reactions    If you agree with this recommendation, please sign this form and fax it to us at [phone]. If you have questions or concerns regarding dry needling or any other treatment we may be providing, please contact us at [phone]    Thank you for allowing us to assist in the care of your patient.   Shaniqua Schuster V, PT    2/15/2022 3:20 PM     NOTE TO PHYSICIAN:  PLEASE COMPLETE THE ORDERS BELOW AND   FAX TO [facility]: [phone]  If you are unable to process this request in 24 hours please contact our office:   [phone] I have read the above request and AGREE to the recommendation of including dry needling as part of the plan of care. ? I have read the above request and DO NOT AGREE to including dry needling as part of the plan of care.   ? I have read the above report and request that my patient continue therapy with the following changes/special instructions:  ________________________________________________________________________    Physicians signature: _______________________________________________     Date: ______________Time:_______________

## 2022-02-15 NOTE — PROGRESS NOTES
PHYSICAL THERAPY - DAILY TREATMENT NOTE    Patient Name: Mary Hector        Date: 2/15/2022  : 1965   YES Patient  Verified  Visit #:     Insurance: Payor: Darby Christianson / Plan: [place] [employer] PPO / Product Type: PPO /      In time: 2:20 P Out time: 3:20 P   Total Treatment Time: 55     BCBS/Medicare Time Tracking (below)   Total Timed Codes (min):  NA 1:1 Treatment Time:  NA     TREATMENT AREA =  Left knee pain [M25.562]  SUBJECTIVE  Pain Level (on 0 to 10 scale):  3-4  / 10   Medication Changes/New allergies or changes in medical history, any new surgeries or procedures?     NO    If yes, update Summary List   Subjective Functional Status/Changes:  []  No changes reported     See progress note to MD            Modalities Rationale:     decrease edema, decrease inflammation and decrease pain to improve patient's ability to return to pain-free ADLs    min [] Estim, type/location:                                      []  att     []  unatt     []  w/US     []  w/ice    []  w/heat    min []  Mechanical Traction: type/lbs                   []  pro   []  sup   []  int   []  cont    []  before manual    []  after manual    min []  Ultrasound, settings/location:      min []  Iontophoresis w/ dexamethasone, location:                                               []  take home patch       []  in clinic   10 min [x]  Ice     []  Heat    location/position: Supine to L knee     min []  Vasopneumatic Device, press/temp:    If using vaso (only need to measure limb vaso being performed on)      pre-treatment girth :       post-treatment girth :       measured at (landmark location) :      min []  Other:    [] Skin assessment post-treatment (if applicable):    [x]  intact    [x]  redness- no adverse reaction                  []redness  adverse reaction:        30 min Therapeutic Exercise:  [x]  See flow sheet   Rationale:      increase ROM and increase strength to improve the patients ability to return to pain-free standing     15 min Therapeutic Activity: [x]  See flow sheet   Rationale:    increase ROM, increase strength, improve balance and increase proprioception to improve the patients ability to return to PLOF     Billed With/As:   [] TE   [] TA   [] Neuro   [] Self Care Patient Education: [x] Review HEP    [] Progressed/Changed HEP based on:   [] positioning   [] body mechanics   [] transfers   [] heat/ice application    [] other:      Other Objective/Functional Measures: Mod TTP along medial hams insertion, mild c/o pain upon MMT of hams on L  FOTO no change     Post Treatment Pain Level (on 0 to 10) scale:   3  / 10     ASSESSMENT  Assessment/Changes in Function:     No change in FOTO (slight worsening although steady improvements with strength), progressed to forward step down & lateral step down today      []  See Progress Note/Recertification   Patient will continue to benefit from skilled PT services to modify and progress therapeutic interventions, address functional mobility deficits, address ROM deficits, address strength deficits, analyze and address soft tissue restrictions, analyze and cue movement patterns, analyze and modify body mechanics/ergonomics, assess and modify postural abnormalities and instruct in home and community integration to attain remaining goals.    Progress toward goals / Updated goals:    Progressing towards LTG 2 & all STGs met      PLAN  []  Upgrade activities as tolerated YES Continue plan of care   []  Discharge due to :    []  Other:      Therapist: Paul Rodriguez PT    Date: 2/15/2022 Time: 3:23 PM     Future Appointments   Date Time Provider China Charles   2/17/2022  2:45 PM Brit Bowie [facility]   2/22/2022  2:15 PM Bella Ma, PT [facility]   2/24/2022  2:45 PM Azael Schuster, PT [facility]

## 2022-02-17 ENCOUNTER — APPOINTMENT (OUTPATIENT)
Dept: PHYSICAL THERAPY | Age: 57
End: 2022-02-17
Payer: COMMERCIAL

## 2022-02-18 ENCOUNTER — APPOINTMENT (OUTPATIENT)
Dept: PHYSICAL THERAPY | Age: 57
End: 2022-02-18
Payer: COMMERCIAL

## 2022-02-22 ENCOUNTER — HOSPITAL ENCOUNTER (OUTPATIENT)
Dept: PHYSICAL THERAPY | Age: 57
Discharge: HOME | End: 2022-02-22
Payer: COMMERCIAL

## 2022-02-22 PROCEDURE — 97110 THERAPEUTIC EXERCISES: CPT

## 2022-02-22 PROCEDURE — 97530 THERAPEUTIC ACTIVITIES: CPT

## 2022-02-22 NOTE — PROGRESS NOTES
PHYSICAL THERAPY - DAILY TREATMENT NOTE    Patient Name: Ephraim Mortimer        Date: 2022  : 1965   YES Patient  Verified  Visit #:     Insurance: Payor: [employer] / Plan: [place] [employer] [place] / Product Type: PPO /      In time: 2:15 P Out time: 3:30 P   Total Treatment Time: 70     BCBS/Medicare Time Tracking (below)   Total Timed Codes (min):  60 1:1 Treatment Time:  45     TREATMENT AREA =  Left knee pain [M25.562]  SUBJECTIVE  Pain Level (on 0 to 10 scale):  3-4  / 10   Medication Changes/New allergies or changes in medical history, any new surgeries or procedures? NO    If yes, update Summary List   Subjective Functional Status/Changes:  []  No changes reported     Patient reports the pain in the back of her knee is about the same, she has popping in the back of her knee.             Modalities Rationale:     decrease edema, decrease inflammation and decrease pain to improve patient's ability to return to pain-free ADLs    min [] Estim, type/location:                                      []  att     []  unatt     []  w/US     []  w/ice    []  w/heat    min []  Mechanical Traction: type/lbs                   []  pro   []  sup   []  int   []  cont    []  before manual    []  after manual    min []  Ultrasound, settings/location:      min []  Iontophoresis w/ dexamethasone, location:                                               []  take home patch       []  in clinic   10 min [x]  Ice     []  Heat    location/position: Supine to L knee     min []  Vasopneumatic Device, press/temp:    If using vaso (only need to measure limb vaso being performed on)      pre-treatment girth :       post-treatment girth :       measured at (landmark location) :      min []  Other:    [] Skin assessment post-treatment (if applicable):    [x]  intact    [x]  redness- no adverse reaction                  []redness  adverse reaction:        40/  25 min Therapeutic Exercise:  [x]  See flow sheet   Rationale: increase ROM and increase strength to improve the patients ability to return to pain-free standing      20 min Therapeutic Activity: [x]  See flow sheet   Rationale:    increase ROM and increase strength to improve the patients ability to return to pain-free walking program      Billed With/As:   [] TE   [] TA   [] Neuro   [] Self Care Patient Education: [x] Review HEP    [] Progressed/Changed HEP based on:   [] positioning   [] body mechanics   [] transfers   [] heat/ice application    [] other:      Other Objective/Functional Measures:    See flow sheet     Post Treatment Pain Level (on 0 to 10) scale:   3  / 10     ASSESSMENT  Assessment/Changes in Function:     Improved tolerance to step downs      []  See Progress Note/Recertification   Patient will continue to benefit from skilled PT services to modify and progress therapeutic interventions, address functional mobility deficits, address ROM deficits, address strength deficits, analyze and address soft tissue restrictions, analyze and cue movement patterns, analyze and modify body mechanics/ergonomics, assess and modify postural abnormalities, address imbalance/dizziness and instruct in home and community integration to attain remaining goals.    Progress toward goals / Updated goals:    Progressing towards newly established LTGs     PLAN  []  Upgrade activities as tolerated YES Continue plan of care   []  Discharge due to :    []  Other:      Therapist: Mackenzie Erazo PT    Date: 2/22/2022 Time: 2:55 PM     Future Appointments   Date Time Provider [place] Charles   2/24/2022  2:45 PM Andrew Schuster, PT [facility]

## 2022-02-24 ENCOUNTER — APPOINTMENT (OUTPATIENT)
Dept: PHYSICAL THERAPY | Age: 57
End: 2022-02-24
Payer: COMMERCIAL

## 2022-02-24 ENCOUNTER — TELEPHONE (OUTPATIENT)
Dept: PHYSICAL THERAPY | Age: 57
End: 2022-02-24

## 2022-04-07 NOTE — PROGRESS NOTES
[address] [facility] PHYSICAL THERAPY AT [address] [place] [address]  M[address] Almanzas [address], [address] [place] [address], [address] [facility] [address]  Phone: [phone]  Fax: [phone] SUMMARY  Patient Name: Hema Lennon : 1965   Treatment/Medical Diagnosis: Left knee pain [M25.562]   Referral Source: Milton De La Rosa MD     Date of Initial Visit: 22 Attended Visits: 6 Missed Visits: 1     SUMMARY OF TREATMENT  Therapeutic exercise including ROM, stretching, gentle strengthening, gait & balance training, postural ed, patient education, HEP instruction, CP. CURRENT STATUS  Mrs. Hero Finley was seen for 1 f/u after most recent progress note on 2-15-22. She had continued to report c/o pain in posterior knee as well as intermittent popping. Tolerance to progressive strengthening such as step downs had continued to improve. No further contact has been made with clinic to continue with PT, therefore patient to be discharged to home program.        RECOMMENDATIONS  Other: Self DC, partially met goals    If you have any questions/comments please contact us directly at [phone]. Thank you for allowing us to assist in the care of your patient.     Therapist Signature: SABRINA Avendaño, cert MDT Date: 81     Time: 7:30 AM

## 2023-02-27 ENCOUNTER — HOSPITAL ENCOUNTER (OUTPATIENT)
Facility: HOSPITAL | Age: 58
Setting detail: RECURRING SERIES
Discharge: HOME | End: 2023-03-02
Payer: COMMERCIAL

## 2023-02-27 PROCEDURE — 97162 PT EVAL MOD COMPLEX 30 MIN: CPT

## 2023-02-27 NOTE — THERAPY EVALUATION
[address] [facility] PHYSICAL THERAPY  [address] [facility], [address], [place], [address] [place] [address] Ph: [phone] Fx: [phone]  Plan of Care / Statement of Necessity for Physical Therapy Services     Patient Name: Harpreet Carpenter : 1965   Medical   Diagnosis: R CMC pain Treatment Diagnosis: Right hand pain [M79.641]   Onset Date: 23     Referral Source: Gena Norman MD [facility]): 2023   Prior Hospitalization: See medical history Provider #: [id number]   Prior Level of Function: Working full 8 hour day on computer   Comorbidities: L CMC DJD   Medications: Verified on Patient Summary List     Assessment / key information:  The patient presents to PT S/P R CMC interpositional arthoplasty on 23. She currently notes pain with typing and using mouse while working on computer. She is unable to work full day uninterrupted, open jars, or  to lift >5 lb. Prior to surgery she was unable to tolerate UE Wbing with wrist in ext. MMT: pinch  R/L 4/8#, tip 2/4#  AROM: dec R thumb ABD and flex, dec wrist ext  Palpation: swelling throughout CMC and thenar eminence      Evaluation Complexity:  History:  MEDIUM  Complexity : 1-2 comorbidities / personal factors will impact the outcome/ POC ; Examination:  MEDIUM Complexity : 3 Standardized tests and measures addressin body structure, function, activity limitation and / or participation in recreation  ;Presentation:  MEDIUM Complexity : Evolving with changing characteristics  ; Clinical Decision Making:  MEDIUM Complexity : FOTO score of 26-74 FOTO score = an established functional score where 100 = no disability, FOTO: 52  Overall Complexity Rating: MEDIUM  Problem List: pain affecting function, decrease ROM, decrease strength, edema affection function, decrease ADL/functional abilities, decrease activity tolerance, decrease flexibility/joint mobility, and decrease transfer abilities    Treatment Plan may include any combination of the followin Therapeutic Exercise, 46112 Neuromuscular Re-Education, 20451 Manual Therapy, 13771 Therapeutic Activity, 92308 Self Care/Home Management, 03076 Electrical Stim unattended, 54638 Electrical Stim attended, 37684 Vasopneumatic Device, 29432 Gait Training, [id number] Mechanical Traction, [id number] Needle Insertion w/o Injection (1 or 2 muscles), and 20061 Needle Insertion w/o Injection (3+ muscles)  Patient / Family readiness to learn indicated by: asking questions, trying to perform skills, interest, return verbalization , and return demonstration   Persons(s) to be included in education: patient (P)  Barriers to Learning/Limitations: none  Measures taken if barriers to learning present: NA  Patient Goal (s): \"less pain, strengthen, flexibility\"  Patient Self Reported Health Status: good  Rehabilitation Potential: good    Short Term Goals: To be accomplished in 3-4 weeks  1) Patient to be independent and compliant with initial HEP to prevent further disability. 2) Patient will report decreased c/o pain to < or = 2/10 to facilitate ease of using computer mouse for 2 hours with manageable sx in R hand. 3) Patient able to perform 10\" primal holds indicating improved wrist ext and UE WBing tolerance in quadruped. Long Term Goals: To be accomplished in 6-8 weeks  1) Patient to be independent & compliant with a progressive, high level HEP in order to maintain gains made in physical therapy. 2) Improve FOTO score to 62 indicating significant functional improvement in order to return to PLOF. 3) Restore L pinch/tip  strength to 90%% of R so strength is available for gripping household objects.     Frequency / Duration: Patient to be seen 2-3 times per week for 6-8 weeks    Patient/ Caregiver education and instruction: Diagnosis, prognosis, self care, activity modification, and exercises [x]  Plan of care has been reviewed with PTA    Certification Period: NA    Sis Villar, PT 2/27/2023       3:15 PM  ===================================================================  I certify that the above Therapy Services are being furnished while the patient is under my care. I agree with the treatment plan and certify that this therapy is necessary. Physician's Signature:_________________________   DATE:_________   TIME:________                           Gena Norman MD    ** Signature, Date and Time must be completed for valid certification **  [facility] [phone].   Thank you

## 2023-02-27 NOTE — PROGRESS NOTES
PHYSICAL / OCCUPATIONAL THERAPY - DAILY TREATMENT NOTE (updated )    Patient Name: Piyush Crabtree    Date: 2023    : 1965  Insurance: Payor: Brenda Acosta / Plan: OPTIMA HMO / Product Type: *No Product type* /      Patient  verified Yes     Visit #   Current / Total 1 12   Time   In / Out 310 400   Pain   In / Out 3-4 3   Subjective Functional Status/Changes: See POC   Changes to:  Meds, Allergies, Med Hx, Sx Hx? If yes, update Summary List yes       TREATMENT AREA =  Right hand pain [M79.641]    OBJECTIVE         Therapeutic Procedures: Tx Min Billable or 1:1 Min Procedure, Rationale, Specifics          Details if applicable:              Details if applicable:            Details if applicable:            Details if applicable:            Details if applicable:       [facility] Totals Reminder: bill using total billable min of TIMED therapeutic procedures (example: do not include dry needle or estim unattended, both untimed codes, in totals to left)  8-22 min = 1 unit; 23-37 min = 2 units; 38-52 min = 3 units; 53-67 min = 4 units; 68-82 min = 5 units   Total Total     [x]  Patient Education billed concurrently with other procedures   [x] Review HEP    [x] Progressed/Changed HEP, detail: thumb AROM abd and flex  [] Other detail:       Objective Information/Functional Measures/Assessment    See POC    Patient will continue to benefit from skilled PT / OT services to modify and progress therapeutic interventions, analyze and address functional mobility deficits, analyze and address ROM deficits, analyze and address strength deficits, analyze and address soft tissue restrictions, analyze and cue for proper movement patterns, analyze and modify for postural abnormalities, analyze and address imbalance/dizziness, and instruct in home and community integration to address functional deficits and attain remaining goals.     Progress toward goals / Updated goals:  []  See Progress Note/Recertification    See POC    PLAN  Yes  Continue plan of care  []  Upgrade activities as tolerated  []  Discharge due to :  []  Other:    Dustin Zhou, PT    2/27/2023    3:14 PM    No future appointments.

## 2023-03-14 ENCOUNTER — APPOINTMENT (OUTPATIENT)
Facility: HOSPITAL | Age: 58
End: 2023-03-14
Payer: COMMERCIAL

## 2023-03-17 ENCOUNTER — APPOINTMENT (OUTPATIENT)
Facility: HOSPITAL | Age: 58
End: 2023-03-17
Payer: COMMERCIAL

## 2023-03-20 ENCOUNTER — HOSPITAL ENCOUNTER (OUTPATIENT)
Facility: HOSPITAL | Age: 58
Setting detail: RECURRING SERIES
Discharge: HOME | End: 2023-03-23
Payer: COMMERCIAL

## 2023-03-20 PROCEDURE — 97112 NEUROMUSCULAR REEDUCATION: CPT

## 2023-03-20 PROCEDURE — 97530 THERAPEUTIC ACTIVITIES: CPT

## 2023-03-20 PROCEDURE — 97110 THERAPEUTIC EXERCISES: CPT

## 2023-03-20 NOTE — PROGRESS NOTES
53-67 min = 4 units; 68-82 min = 5 units   Total Total     [x]  Patient Education billed concurrently with other procedures   [x] Review HEP    [] Progressed/Changed HEP, detail:  ]Issued written HEP and reviewed    [] Other detail:       Objective Information/Functional Measures/Assessment    Difficulty isolating movement to wrist and keeping forearm stable    Patient will continue to benefit from skilled PT / OT services to modify and progress therapeutic interventions, analyze and address functional mobility deficits, analyze and address ROM deficits, analyze and address strength deficits, analyze and address soft tissue restrictions, analyze and cue for proper movement patterns, analyze and modify for postural abnormalities, analyze and address imbalance/dizziness, and instruct in home and community integration to address functional deficits and attain remaining goals. Progress toward goals / Updated goals:  []  See Progress Note/Recertification    Progressing towards STG1    PLAN  Yes  Continue plan of care  []  Upgrade activities as tolerated  []  Discharge due to :  []  Other:    Rusty Kaplan, PT    3/20/2023    6:14 PM    No future appointments.